# Patient Record
Sex: MALE | Race: AMERICAN INDIAN OR ALASKA NATIVE
[De-identification: names, ages, dates, MRNs, and addresses within clinical notes are randomized per-mention and may not be internally consistent; named-entity substitution may affect disease eponyms.]

---

## 2017-08-19 ENCOUNTER — HOSPITAL ENCOUNTER (INPATIENT)
Dept: HOSPITAL 31 - C.ER | Age: 77
LOS: 4 days | Discharge: HOME | DRG: 287 | End: 2017-08-23
Attending: SPECIALIST | Admitting: SPECIALIST
Payer: MEDICARE

## 2017-08-19 DIAGNOSIS — E66.9: ICD-10-CM

## 2017-08-19 DIAGNOSIS — M19.90: ICD-10-CM

## 2017-08-19 DIAGNOSIS — I24.9: Primary | ICD-10-CM

## 2017-08-19 DIAGNOSIS — Z86.73: ICD-10-CM

## 2017-08-19 DIAGNOSIS — E78.00: ICD-10-CM

## 2017-08-19 DIAGNOSIS — Z87.891: ICD-10-CM

## 2017-08-19 DIAGNOSIS — I35.1: ICD-10-CM

## 2017-08-19 DIAGNOSIS — I77.810: ICD-10-CM

## 2017-08-19 DIAGNOSIS — I10: ICD-10-CM

## 2017-08-19 DIAGNOSIS — J44.9: ICD-10-CM

## 2017-08-19 LAB
ALBUMIN/GLOB SERPL: 1.1 {RATIO} (ref 1–2.1)
ALP SERPL-CCNC: 75 U/L (ref 38–126)
ALT SERPL-CCNC: 41 U/L (ref 21–72)
APTT BLD: 24 SECONDS (ref 21–34)
AST SERPL-CCNC: 31 U/L (ref 17–59)
BASOPHILS # BLD AUTO: 0.1 K/UL (ref 0–0.2)
BASOPHILS NFR BLD: 1.1 % (ref 0–2)
BILIRUB SERPL-MCNC: 1.1 MG/DL (ref 0.2–1.3)
BUN SERPL-MCNC: 11 MG/DL (ref 9–20)
CALCIUM SERPL-MCNC: 9.5 MG/DL (ref 8.6–10.4)
CHLORIDE SERPL-SCNC: 101 MMOL/L (ref 98–107)
CO2 SERPL-SCNC: 27 MMOL/L (ref 22–30)
EOSINOPHIL # BLD AUTO: 0.2 K/UL (ref 0–0.7)
EOSINOPHIL NFR BLD: 2.8 % (ref 0–4)
ERYTHROCYTE [DISTWIDTH] IN BLOOD BY AUTOMATED COUNT: 14.7 % (ref 11.5–14.5)
GLOBULIN SER-MCNC: 3.4 GM/DL (ref 2.2–3.9)
GLUCOSE SERPL-MCNC: 92 MG/DL (ref 75–110)
HCT VFR BLD CALC: 48.7 % (ref 35–51)
INR PPP: 1
LYMPHOCYTES # BLD AUTO: 1.8 K/UL (ref 1–4.3)
LYMPHOCYTES NFR BLD AUTO: 32.9 % (ref 20–40)
MCH RBC QN AUTO: 29.9 PG (ref 27–31)
MCHC RBC AUTO-ENTMCNC: 32.4 G/DL (ref 33–37)
MCV RBC AUTO: 92.5 FL (ref 80–94)
MONOCYTES # BLD: 0.5 K/UL (ref 0–0.8)
MONOCYTES NFR BLD: 8.9 % (ref 0–10)
NRBC BLD AUTO-RTO: 0 % (ref 0–2)
PLATELET # BLD: 170 K/UL (ref 130–400)
PMV BLD AUTO: 6.9 FL (ref 7.2–11.7)
POTASSIUM SERPL-SCNC: 4.2 MMOL/L (ref 3.6–5.2)
PROT SERPL-MCNC: 7.4 G/DL (ref 6.3–8.3)
SODIUM SERPL-SCNC: 139 MMOL/L (ref 132–148)
WBC # BLD AUTO: 5.5 K/UL (ref 4.8–10.8)

## 2017-08-19 RX ADMIN — NITROGLYCERIN SCH EA: 20 OINTMENT TOPICAL at 23:17

## 2017-08-19 RX ADMIN — NITROGLYCERIN SCH EA: 20 OINTMENT TOPICAL at 17:58

## 2017-08-19 NOTE — RAD
HISTORY:

chest pain  



COMPARISON:

Chest x-ray performed 12/18/15 



TECHNIQUE:

Chest, one view.



FINDINGS:

Examination limited by habitus and hypoinflation.



LUNGS:

No focal consolidation.



Please note that chest x-ray has limited sensitivity for the 

detection of pulmonary masses.



PLEURA:

No significant pleural effusion identified. No definite pneumothorax .



CARDIOVASCULAR:

Heart size appears top normal.  Atherosclerotic calcification of the 

aortic knob.



OSSEOUS STRUCTURES:

 Degenerative changes of the spine. 



VISUALIZED UPPER ABDOMEN:

Unremarkable.



OTHER FINDINGS:

None.



IMPRESSION:

Hypoinflation.

## 2017-08-19 NOTE — C.PDOC
History Of Present Illness


77 year old male presents to the ED with complaints of left sided chest pain 

that radiates to the left arm beginning this morning. Patient states he awoke 

this morning went to the bathroom and upon  laying back in bed, the chest pain 

began. Two hours later the pain self resolved. Patient denies shortness of 

breath, nausea, vomiting, fever, history of similar symptoms, or history of a 

stress test.


Time Seen by Provider: 08/19/17 09:44


Chief Complaint (Nursing): Chest Pain


History Per: Patient


History/Exam Limitations: no limitations


Onset/Duration Of Symptoms: Hrs


Current Symptoms Are (Timing): Gone


Quality: "Pain"


Associated Symptoms: denies: Nausea, Dyspnea, Diaphoresis, Syncope


Modifying Factors: None


Alleviating Factors: None


Recent travel outside of the [place]: No


Additional History Per: Prior Records





Past Medical History


Reviewed: Historical Data, Nursing Documentation, Vital Signs


Vital Signs: 


 Last Vital Signs











Temp  98 F   08/19/17 14:06


 


Pulse  66   08/19/17 14:06


 


Resp  18   08/19/17 14:06


 


BP  139/84   08/19/17 14:06


 


Pulse Ox  97   08/19/17 17:00














- Medical History


PMH: Arthritis, HTN, Hypercholesterolemia, TIA (2014)





- [facility] Procedures








FLUOROSCOPY OF MULT COR ART USING L OSM CONTRAST (09/13/16)


FLUOROSCOPY OF RIGHT AND LEFT HEART USING L OSM CONTRAST (09/13/16)


MEASURE OF CARDIAC SAMPL & PRESSURE, L HEART, PERC APPROACH (09/13/16)








Family History: States: Unknown Family Hx





- Social History


Hx Tobacco Use: No


Hx Alcohol Use: No


Hx Substance Use: No





- Immunization History


Hx Tetanus Toxoid Vaccination: No


Hx Influenza Vaccination: No


Hx Pneumococcal Vaccination: No





Review Of Systems


Constitutional: Negative for: Fever, Chills


Cardiovascular: Positive for: Chest Pain


Gastrointestinal: Negative for: Nausea, Vomiting, Abdominal Pain, Diarrhea


Musculoskeletal: Positive for: Arm Pain (left arm pain radiating from chest )





Physical Exam





- Physical Exam


Appears: Non-toxic, No Acute Distress


Skin: Warm, Dry


Head: Atraumatic, Normacephalic


Eye(s): bilateral: EOMI, left: Other ((post surgical- chronic))


Nose: Normal


Oral Mucosa: Moist


Neck: Normal ROM, Supple


Chest: Symmetrical, No Deformity, No Tenderness


Cardiovascular: Rhythm Regular, No Murmur


Respiratory: Normal Breath Sounds, No Rales, No Rhonchi, No Stridor, No Wheezing


Gastrointestinal/Abdominal: Soft, No Tenderness, No Distention, No Guarding, No 

Rebound


Extremity: Normal ROM, No Tenderness, No Pedal Edema, No Calf Tenderness, 

Capillary Refill (good capillary refill, less than two seconds ), No Deformity, 

No Swelling


Neurological/Psych: Oriented x3, Normal Speech, Normal Cognition, Normal 

Cranial Nerves, Normal Motor, Normal Sensation





ED Course And Treatment





- Laboratory Results


Result Diagrams: 


 08/19/17 09:54





 08/19/17 09:54


ECG: Interpreted By Me, Viewed By Me


ECG Rhythm: Sinus Rhythm (75 BPM)


Interpretation Of ECG: T-wave inversions in lateral leads, V3-V6. Unchanged EKG 

from 09/13/2016.


O2 Sat by Pulse Oximetry: 97 (room air )





- Radiology


CXR: Viewed By Me, Read By Radiologist


CXR Interpretation: Yes: Other (Impression: Hypoinflation.)


Progress Note: EKG, blood work, and CXR were ordered. Patient was given Ecotrin 

and IV fluids.  CXR findings:  COMPARISON:  Chest x-ray performed 12/18/15.  

TECHNIQUE:  Chest, one view.  FINDINGS:  Examination limited by habitus and 

hypoinflation.  LUNGS:  No focal consolidation.  Please note that chest x-ray 

has limited sensitivity for the detection of pulmonary masses.  PLEURA:  No 

significant pleural effusion identified. No definite pneumothorax .  

CARDIOVASCULAR:  Heart size appears top normal.  Atherosclerotic calcification 

of the aortic knob.  OSSEOUS STRUCTURES:  Degenerative changes of the spine.  

VISUALIZED UPPER ABDOMEN:  Unremarkable.  OTHER FINDINGS:  None.





- Physician Consult Information


Time Consulting Physician Contacted: 10:40 (contacted again at 11:15am )


Physician Contacted: Ebenezer Mcpherson


Outcome Of Conversation: Discussed case with Dr. Mcpherson at 11:33am and coty 

on plan and admission.





Disposition





- Disposition


Disposition: HOSPITALIZED


Disposition Time: 10:00


Condition: STABLE





- Clinical Impression


Clinical Impression: 


 Chest pain








- Scribe Statement


The provider has reviewed the documentation as recorded by the Scribe





Day Hector





All medical record entries made by the Scribe were at my direction and 

personally dictated by me. I have reviewed the chart and agree that the record 

accurately reflects my personal performance of the history, physical exam, 

medical decision making, and the department course for this patient. I have 

also personally directed, reviewed, and agree with the discharge instructions 

and disposition.

## 2017-08-20 RX ADMIN — ENOXAPARIN SODIUM SCH MG: 120 INJECTION SUBCUTANEOUS at 18:49

## 2017-08-20 RX ADMIN — NITROGLYCERIN SCH EA: 20 OINTMENT TOPICAL at 13:03

## 2017-08-20 RX ADMIN — NITROGLYCERIN SCH EA: 20 OINTMENT TOPICAL at 06:02

## 2017-08-20 RX ADMIN — NITROGLYCERIN SCH EA: 20 OINTMENT TOPICAL at 18:49

## 2017-08-20 NOTE — CON
DATE:



CARDIOLOGY CONSULT



REASON FOR CONSULTATION:  Chest pain.



HISTORY OF PRESENT ILLNESS:  The patient is a 77-year-old -American

male who has a history of hypertension.  The patient underwent cardiac

evaluation in September of last year after he presented because of syncopal

episode and EKG was consistent with anterolateral ischemic EKG changes.  A

cardiac catheterization revealed normal coronary circulation and normal

ejection fraction.  The echocardiographic study revealed normal left

ventricular systolic function with a reduced compliance and mild aortic

insufficiency.  The patient presents because of chest discomfort and the

patient states that he used the bathroom and went to rest in bed, he

started to experience left-sided chest pain, but could not characterize it.

The patient denies any radiation of his chest pain, _____ lasted few

minutes and patient came to the emergency room.  The patient denies any

associated diaphoresis or shortness of breath.



SOCIAL HISTORY:  Nonsmoker.  Lives with his wife.



MEDICATIONS:  The patient received aspirin 325 once a day in the emergency

room and normal saline infusion.  The patient's home medications include

hydralazine 25 mg twice a day, Crestor 10 mg once a day, losartan 50 mg

once a day, Bystolic 5 mg daily.



REVIEW OF SYSTEMS:  No nausea or vomiting.  No fever or chills.  No recent

dizziness or syncope.



PHYSICAL EXAMINATION

GENERAL:  The patient is an elderly male who does not appear to be in any

acute distress.

VITAL SIGNS:  Blood pressure 150/89, heart rate 69, temperature 97.9, and

respirations 18.

HEENT:  Normocephalic.

NECK:  No JVD.

CHEST:  Clear.

HEART:  S1 and S2 regular.

ABDOMEN:  Soft.

EXTREMITIES:  No edema.  No calf tenderness.



LABORATORY DATA:  SMA-7:  Sodium 139, potassium 4.2, chloride 101, CO2 of

27, glucose 92, BUN 11, creatinine 1.0.  One set of troponin is negative. 

Hemoglobin and hematocrit 15.8 and 48.7.  White count and platelet count

are within normal limit.  EKG revealed sinus rhythm with nonspecific

anterolateral Q-wave changes, although the computer reading was ischemic

anterolateral EKG changes.



ASSESSMENT:

1.  Chest pain, rule out myocardial infarction.

2.  Hypertension.

3.  Abnormal EKG with evidence of nonspecific lateral Q-wave changes.



RECOMMENDATIONS:  Resume home medications including Crestor 10 mg once a

day, hydralazine 25 mg once a day, Cozaar 50 mg once a day, Bystolic 5 mg

daily.  Monitor daily EKGs and serial cardiac enzymes.  Obtain serum

D-dimer.  I did review the coronary angiography images that were performed

in 09/2016 and it did confirm normal coronary circulation and it is

unlikely that patient would develop a significant disease in such a short

period.







__________________________________________

Mick Jain MD



DD:  08/19/2017 13:07:22

DT:  08/19/2017 23:35:05

Job # [id number]

## 2017-08-20 NOTE — CT
CTA chest PE protocol 



Indication: Rule out PE 



Technique: 



Contiguous axial images were obtained through the chest with 

intravenous contrast enhancement. Sagittal and coronal 

reconstructions were generated and reviewed. 



This CT exam was performed using 1 or more of the falling dose 

reduction techniques: Automated exposure control, adjustment of the 

MAA and/or kV according to patient size, and/or use of iterative 

reconstruction technique. 



IV Contrast: 100 mL Visipaque 320 







Radiation dose (DLP): 1490.47 MGy-cm. 



Comparison: Chest x-ray performed 8/19/17 



Findings: 



Visualized portions of the inferior thyroid gland appear 

unremarkable. The mediastinal and hilar vascular structures appear 

within normal limits.  The heart appears within normal limits of 

size. 



No large central or segmental pulmonary embolus evident.



No focal consolidation. No pleural effusion. No pneumothorax. 



Small hiatal hernia. 



Limited visualized portions of the upper abdomen demonstrates 16.1 cm 

cystic appearing lesion arising from the right kidney and 6.1 cm left 

renal cyst.  Additional too small to characterize as well a cystic 

renal hypodensities are identified. 



Degenerative changes of the spine. Mild kyphosis. Anterior confluent 

osteophyte formation. 



Impression: 



No large central or segmental pulmonary embolus identified. 



Small hiatal hernia. 



16.1 cm cystic appearing lesion arising from the right kidney and 6.1 

cm left renal cyst.  Additional too small to characterize 

hypodensities as well as probable cysts noted bilaterally.

## 2017-08-20 NOTE — HP
HISTORY OF PRESENT ILLNESS:  This patient is a 77-year-old male with

history of hypertension, COPD, arthritis, and hyperlipidemia.  The patient

has a history of TIA in the past.  The patient came to the emergency room

complaining of chest pain.  The patient has stated that he was lying down

when he started developing some left-sided chest pain.  It stayed for at

least 2 hours, and he was prompted by his family to come to the emergency

room, which was done, and eventually the patient came to the hospital with

chest pain and has received medications, but now at the time of evaluation,

the patient denied any chest pain now, but feels somewhat weak.



ALLERGIES:  THE PATIENT HAS NO KNOWN ALLERGY.



PAST MEDICAL HISTORY:  On admission, history of hypertension,

hyperlipidemia, TIA, osteoarthritis, and obesity.



SOCIAL HISTORY:  No smoking or alcohol abuse, but the patient apparently

used to smoke before.



FAMILY HISTORY:  No inherited disease.



REVIEW OF SYSTEMS:

RESPIRATORY:  There is some shortness of breath on exertion.

CARDIOVASCULAR:  The patient had a chest pain before, but now the patient

is chest pain free.

GASTROINTESTINAL:  No nausea or vomiting.

GENITOURINARY:  No dysuria, but the patient has been having the nocturia 3

to 4 times at night.

NEUROLOGIC:  The patient feels somewhat weak.



PHYSICAL EXAMINATION:

GENERAL:  The patient is alert, awake, and oriented x3.

VITAL SIGNS:  Blood pressure 139/84, pulse 66, respirations 18, and

temperature 98.

HEENT:  Head is normocephalic.  Mouth:  Multiple _____.

NECK:  Supple.  No JVD.

LUNGS:  Clear.

HEART:  Regular rate and rhythm, but there is a split of the first sound

noted, which seems new.

ABDOMEN:  Soft, obese, no tenderness.

EXTREMITIES:  There is some tenderness of the left hip.  There is no edema.

NEUROLOGIC:  Unsteady gait.



LABORATORY DATA:  The patient had some tests done.  WBC 5.5, hemoglobin

16.8, hematocrit 48.7, and platelets 170.  Chemistries:  Sodium 139,

potassium 4.2, chloride 101, bicarb 27, BUN 11, creatinine 1.  Troponin is

less than _____.  Pro-BNP is 348.



IMPRESSION:

1.  Chest pain, acute coronary syndrome, rule out myocardial infarction.

2.  Hypertension.

3.  Osteoarthritis.

4.  Hyperlipidemia.

5.  Obesity.



The patient will have a cardiology consult with Dr. Jain.



Thank you.





__________________________________________

Ebenezer Mcpherson MD







DD:  08/19/2017 15:26:17

DT:  08/20/2017 3:05:03

Job # [id number]

## 2017-08-20 NOTE — PN
DATE:



SUBJECTIVE:  The patient denies chest pain.  No reported ventricular

arrhythmia.



PHYSICAL EXAMINATION:

VITAL SIGNS:  Blood pressure 142/83, heart rate 71, temperature 98.2,

respiration 20.

HEENT:  Normocephalic.

CHEST:  Clear.

HEART:  S1 and S2, regular.

ABDOMEN:  Soft.

EXTREMITIES:  No edema.



LABORATORY DATA:  D-dimer is 775.  Chest x-ray was unremarkable except for

prominent bronchovascular markings.



ASSESSMENT:

1.  Chest pain, rule out myocardial infarction.

2.  Rule out pulmonary infarction.

3.  Hypertension.



RECOMMENDATIONS:  Continue Bystolic at 5 mg daily, hydralazine 25 mg twice

a day, Crestor 10 mg once a day, aspirin 81 mg once day, subcutaneous

heparin 5000 units twice a day.  I did request stat CT angio of the chest

to rule out pulmonary embolism.  Obtain 12-lead EKG and one more set of

troponin.





__________________________________________

Mick Jain MD



DD:  08/20/2017 13:06:39

DT:  08/20/2017 16:44:38

Job # [id number]

## 2017-08-21 LAB
ALBUMIN/GLOB SERPL: 1.1 {RATIO} (ref 1–2.1)
ALP SERPL-CCNC: 70 U/L (ref 38–126)
ALT SERPL-CCNC: 39 U/L (ref 21–72)
AST SERPL-CCNC: 35 U/L (ref 17–59)
BASOPHILS # BLD AUTO: 0 K/UL (ref 0–0.2)
BASOPHILS NFR BLD: 0.8 % (ref 0–2)
BILIRUB SERPL-MCNC: 0.8 MG/DL (ref 0.2–1.3)
BUN SERPL-MCNC: 13 MG/DL (ref 9–20)
CALCIUM SERPL-MCNC: 9.2 MG/DL (ref 8.6–10.4)
CHLORIDE SERPL-SCNC: 103 MMOL/L (ref 98–107)
CHOLEST SERPL-MCNC: 132 MG/DL (ref 0–199)
CO2 SERPL-SCNC: 25 MMOL/L (ref 22–30)
EOSINOPHIL # BLD AUTO: 0.2 K/UL (ref 0–0.7)
EOSINOPHIL NFR BLD: 4.3 % (ref 0–4)
ERYTHROCYTE [DISTWIDTH] IN BLOOD BY AUTOMATED COUNT: 15 % (ref 11.5–14.5)
GLOBULIN SER-MCNC: 3.1 GM/DL (ref 2.2–3.9)
GLUCOSE SERPL-MCNC: 86 MG/DL (ref 75–110)
HCT VFR BLD CALC: 44 % (ref 35–51)
LYMPHOCYTES # BLD AUTO: 1.9 K/UL (ref 1–4.3)
LYMPHOCYTES NFR BLD AUTO: 32.5 % (ref 20–40)
MCH RBC QN AUTO: 29.7 PG (ref 27–31)
MCHC RBC AUTO-ENTMCNC: 32.3 G/DL (ref 33–37)
MCV RBC AUTO: 92.1 FL (ref 80–94)
MONOCYTES # BLD: 0.4 K/UL (ref 0–0.8)
MONOCYTES NFR BLD: 7.2 % (ref 0–10)
NRBC BLD AUTO-RTO: 0.2 % (ref 0–2)
PLATELET # BLD: 158 K/UL (ref 130–400)
PMV BLD AUTO: 7.3 FL (ref 7.2–11.7)
POTASSIUM SERPL-SCNC: 3.8 MMOL/L (ref 3.6–5.2)
PROT SERPL-MCNC: 6.4 G/DL (ref 6.3–8.3)
SODIUM SERPL-SCNC: 136 MMOL/L (ref 132–148)
TSH SERPL-ACNC: 2.03 MIU/L (ref 0.46–4.68)
WBC # BLD AUTO: 5.7 K/UL (ref 4.8–10.8)

## 2017-08-21 RX ADMIN — ENOXAPARIN SODIUM SCH MG: 120 INJECTION SUBCUTANEOUS at 17:32

## 2017-08-21 RX ADMIN — NITROGLYCERIN SCH EA: 20 OINTMENT TOPICAL at 17:32

## 2017-08-21 RX ADMIN — NITROGLYCERIN SCH EA: 20 OINTMENT TOPICAL at 06:00

## 2017-08-21 RX ADMIN — ENOXAPARIN SODIUM SCH MG: 120 INJECTION SUBCUTANEOUS at 06:06

## 2017-08-21 RX ADMIN — NITROGLYCERIN SCH EA: 20 OINTMENT TOPICAL at 13:00

## 2017-08-21 RX ADMIN — NITROGLYCERIN SCH EA: 20 OINTMENT TOPICAL at 00:27

## 2017-08-21 NOTE — CARD
--------------- APPROVED REPORT --------------





EXAM: Two-dimensional and M-mode echocardiogram with Doppler and 

color Doppler.



Other Information 

Quality : GoodRhythm : NSR



INDICATION

Chest Pain Syncope Transient Ishemic Attack



RISK FACTORS

Hypertension 

Hyperlipidemia



2D DIMENSIONS 

IVSd1.6   (0.7-1.1cm)LVDd4.5   (3.9-5.9cm)

PWd1.5   (0.7-1.1cm)LVDs3.2   (2.5-4.0cm)

FS (%) 27.9   %LVEF (%)54.1   (>50%)



M-Mode DIMENSIONS 

Left Atrium (MM)3.55   (2.5-4.0cm)Aortic Root4.05   (2.2-3.7cm)

Aortic Cusp Exc.1.07   (1.5-2.0cm)



Mitral Valve

MV E Sbietojj00.7cm/sMV A Ziauszby29.9cm/sE/A ratio0.5



TDI

E/Lateral E'0.0E/Medial E'0.0



 LEFT VENTRICLE 

The left ventricle is normal size.

There is mild concentric left ventricular hypertrophy.

The left ventricular systolic function is normal.

The left ventricular ejection fraction is within the normal range.

There is normal LV segmental wall motion.

Transmitral Doppler flow pattern is Grade I-abnormal relaxation 

pattern.



 RIGHT VENTRICLE 

The right ventricle is normal size.

The right ventricular systolic function is normal.



 ATRIA 

The left atrium size is normal.

The right atrium size is normal.



 AORTIC VALVE 

The aortic valve is mildly calcified but opens well.

There is trace to mild aortic regurgitation.

There is no aortic valvular stenosis. 



 MITRAL VALVE 

The mitral valve is normal in structure.

There is no mitral valve regurgitation noted.



 TRICUSPID VALVE 

The tricuspid valve is normal in structure.

There is no tricuspid valve regurgitation noted.



 PULMONIC VALVE 

The pulmonary valve is normal in structure.



 GREAT VESSELS 

The aortic root displays mild sclerocalcific changes.

The IVC is normal in size and collapses >50% with inspiration.



 PERICARDIAL EFFUSION 

There is no pericardial effusion.



<Conclusion>

There is mild concentric left ventricular hypertrophy. The left 

ventricular systolic function is normal. There is normal LV segmental 

wall motion.

Transmitral Doppler flow pattern is Grade I-abnormal relaxation 

pattern.

The right ventricular systolic function is normal.

There is trace to mild aortic regurgitation. The aortic valve is 

mildly calcified but opens well.

The aortic root displays mild sclerocalcific changes.

There is no pericardial effusion.

## 2017-08-21 NOTE — PN
SUBJECTIVE:  The patient denies any chest pain or shortness of breath.  No

reported ventricular arrhythmia.



PHYSICAL EXAMINATION:

VITAL SIGNS:  Blood pressure 129/75, heart rate 60, temperature 97.3,

respiration 20.

HEENT:  Normocephalic.

CHEST:  Clear.

HEART:  S1 and S2, regular.

EXTREMITIES:  No edema.



LABORATORY DATA:  Hemoglobin and hematocrit 14.2 and 44.2, white count and

platelet count are within normal limit.  SMA-7 is within normal limit. 

Troponins were as follows starting from second set 3.76, 3.01 and 2.0.



CT angio of the chest, no central pulmonary embolus.



ASSESSMENT:

1.  Consider non-ST elevation myocardial infarction.

2.  Hypertension.



RECOMMENDATIONS:  Case was discussed yesterday *------* Dr. Mcpherson.  The

patient was placed on subcutaneous Lovenox at 110 mg twice a day, yesterday

and was started on Plavix 75 mg once a day, Crestor was increased to 40 mg

daily, continue current Bystolic and hydralazine.  I will review echo

cardiac study, cardiac cath was discussed with the patient; however, it

could not be performed today because of scheduling issue with the cath lab

and will be performed tomorrow at 10 a.m. unless any emergency arise such

as recurrent chest pain, ventricular tachycardia, CHF, or hypotension.







__________________________________________

Mick Jain MD





DD:  08/21/2017 12:34:04

DT:  08/21/2017 13:54:13

Job # [id number]

## 2017-08-22 VITALS — RESPIRATION RATE: 20 BRPM | OXYGEN SATURATION: 95 %

## 2017-08-22 LAB
BUN SERPL-MCNC: 14 MG/DL (ref 9–20)
CALCIUM SERPL-MCNC: 8.8 MG/DL (ref 8.6–10.4)
CHLORIDE SERPL-SCNC: 103 MMOL/L (ref 98–107)
CO2 SERPL-SCNC: 24 MMOL/L (ref 22–30)
ERYTHROCYTE [DISTWIDTH] IN BLOOD BY AUTOMATED COUNT: 14.5 % (ref 11.5–14.5)
ESTIM. PROBABILITY CANCER: 1 PERCENT
GLUCOSE SERPL-MCNC: 90 MG/DL (ref 75–110)
HCT VFR BLD CALC: 44.5 % (ref 35–51)
MCH RBC QN AUTO: 30 PG (ref 27–31)
MCHC RBC AUTO-ENTMCNC: 32.3 G/DL (ref 33–37)
MCV RBC AUTO: 93.1 FL (ref 80–94)
PLATELET # BLD: 177 K/UL (ref 130–400)
PMV BLD AUTO: 7 FL (ref 7.2–11.7)
POTASSIUM SERPL-SCNC: 4 MMOL/L (ref 3.6–5.2)
PSA SERPL-MCNC: 0.4 NG/ML (ref ?–4)
SODIUM SERPL-SCNC: 137 MMOL/L (ref 132–148)
WBC # BLD AUTO: 6.2 K/UL (ref 4.8–10.8)

## 2017-08-22 PROCEDURE — B2111ZZ FLUOROSCOPY OF MULTIPLE CORONARY ARTERIES USING LOW OSMOLAR CONTRAST: ICD-10-PCS | Performed by: SPECIALIST

## 2017-08-22 PROCEDURE — 4A023N7 MEASUREMENT OF CARDIAC SAMPLING AND PRESSURE, LEFT HEART, PERCUTANEOUS APPROACH: ICD-10-PCS | Performed by: SPECIALIST

## 2017-08-22 PROCEDURE — B2151ZZ FLUOROSCOPY OF LEFT HEART USING LOW OSMOLAR CONTRAST: ICD-10-PCS | Performed by: SPECIALIST

## 2017-08-22 PROCEDURE — B3101ZZ FLUOROSCOPY OF THORACIC AORTA USING LOW OSMOLAR CONTRAST: ICD-10-PCS | Performed by: SPECIALIST

## 2017-08-22 RX ADMIN — NITROGLYCERIN SCH EA: 20 OINTMENT TOPICAL at 00:31

## 2017-08-22 RX ADMIN — NITROGLYCERIN SCH EA: 20 OINTMENT TOPICAL at 18:03

## 2017-08-22 RX ADMIN — ENOXAPARIN SODIUM SCH MG: 120 INJECTION SUBCUTANEOUS at 18:03

## 2017-08-22 RX ADMIN — ENOXAPARIN SODIUM SCH: 120 INJECTION SUBCUTANEOUS at 05:22

## 2017-08-22 RX ADMIN — NITROGLYCERIN SCH EA: 20 OINTMENT TOPICAL at 05:23

## 2017-08-22 RX ADMIN — NITROGLYCERIN SCH EA: 20 OINTMENT TOPICAL at 12:48

## 2017-08-22 NOTE — PN
DATE:



SUBJECTIVE:  Today, the patient is alert and awake, denied any shortness of

breath.  No chest pain.  No palpitation.  The patient is due for a cardiac

catheterization today.



PHYSICAL EXAMINATION

VITAL SIGNS:  The patient has a blood pressure of 145/78, pulse 65,

respiration 20 and temperature 97.6

NECK:  Supple.  No JVD.

LUNGS:  Clear.

HEART:  Regular rate and rhythm.  Positive murmur.

ABDOMEN:  Soft and nontender.  Positive bowel sounds.

EXTREMITIES:  There is no edema, but there is some tenderness to the left

heel.



LABORATORY DATA:  The patient had blood tests done.  WBC is 6.2, hemoglobin

14.4, hematocrit 44.5 and platelet 177.  Chemistry showed sodium of 137,

potassium 4, chloride 103, bicarb is 24, BUN is 14 and creatinine is 1. 

Also today, the patient had cardiac catheterization done today by Dr. Jain, cardiologist and apparently there is no evidence of stenosis or

blockage, so the coronaries ______ are clear.



PLAN:  We are going to ______ medication.  The patient will be on aspirin

and Plavix.  The patient will be discharged home soon, possibly early

morning.





__________________________________________

Ebenezer Mcpherson MD



DD:  08/22/2017 19:03:35

DT:  08/22/2017 19:37:25

Job # [id number]

## 2017-08-22 NOTE — CARDCATH
PROCEDURE DATE:



The patient is a 77-year-old  male who has a history of

hypertension, presented with chest pain.  Troponin was elevated.  CT angio

of the chest revealed no evidence of central pulmonary embolism.  The

patient had cardiac catheterization in September of last year that was

unremarkable.  Cardiac catheterization was recommended again.  The

procedure and its risks were fully explained to the patient, who understood

them and agreed for the procedure.



PROCEDURES:

1.  _____ 1% Lidocaine, a 6-inch sheath was placed at right femoral artery.

2.  Left and right coronary angiography performed with 6-inch JL4 and JR4

diagnostic catheter.

3.  Left angiogram and aortogram were performed with a 6-inch pigtail

catheter.  The patient tolerated the procedure well without any

complications.



ANGIOGRAPHIC FINDINGS:  Selective injection of the left coronary artery

revealed left main to be a normal vessel, left main bifurcated into medium

size LAD and medium size circumflex artery.  Other than myocardial bridging

of the mid to distal LAD, the left coronary system was angiographically

unremarkable.  Selective injection of the right coronary artery revealed a

medium size codominant vessel, otherwise angiographically unremarkable. 

Left ventriculogram performed in the JAY projection revealed mild apical

hypokinesis.  Overall ejection fraction is maintained at 55%.  Aortography

performed in Wallisian projection revealed dilated aortic root and arch.  There

was no evidence of aortic dissection.



CONCLUSION:  Unremarkable coronary circulation and normal ejection

fraction.



CONDITION:  Continue current antiplatelet therapy.  A possibility of small

vessel disease cannot be completely excluded.  In the mean time, I will

request venous Doppler of the lower extremities.





__________________________________________

Mick Jain MD





DD:  08/22/2017 11:16:04

DT:  08/22/2017 13:34:05

Job # [id number]

## 2017-08-22 NOTE — PN
DATE:  8/21/2017



SUBJECTIVE:  Today the patient is alert and awake.  Denies any chest pain

and no shortness of breath.  No coughing and the patient had a bowel

movement.



PHYSICAL EXAMINATION:

VITAL SIGNS:  Blood pressure of 127/75, pulse rate 60 and respirations are

20.

HEENT:  Head is normocephalic.  Mouth is ------ to close.

NECK:  Supple.

LUNGS:  Clear.

HEART:  Regular rate and rhythm, but there is a split of the first heart

sound noted, but is disappearing today as comparison to yesterday.

ABDOMEN:  Soft, obese and nontender.

EXTREMITIES:  There is no edema, but there is tenderness in the left hip.



LABORATORY DATA:  Blood work done shows WBC of 5.7, hemoglobin of 14.3,

hematocrit 44 and platelets 168.  Chemistries:  Sodium 136, potassium 3.8,

chloride 103, bicarb 25, BUN 13, creatinine 1.1 and glucose 86.  The

troponin went down to 2 from 3.01.  Albumin is 3.3, globulin 3.1, ------,

LDH 68, HDL is 64 and TSH is 2.03.



PLAN:  The patient has a echocardiogram and echocardiogram done today, so

there is a good ejection fraction of ------ and there is a mild concentric

left ventricular hypertrophy and left ventricular systolic function is

normal and normal LV segmental wall motion.  So, case was discussed with

Dr. Jain, cardiologist.  The plan is cardiac catheterization in the

morning and also the case was reviewed and discussed with Aimee Chang,

nurse practitioner and the patient is on Lovenox, Plavix, and aspirin.





__________________________________________

Ebenezer Mcpherson MD





DD:  08/21/2017 19:23:03

DT:  08/21/2017 23:27:21

Job # [id number]

## 2017-08-22 NOTE — PN
SUBJECTIVE:  Today, the patient is alert and awake and denies any shortness

of breath.  No chest pain.  The patient is comfortably resting in bed.  The

patient denies any dizziness, but occasional palpitation.



PHYSICAL EXAMINATION:

VITAL SIGNS:  The patient has blood pressure of 142/83, pulse 71,

respirations 20, temperature 98.2.

HEENT:  Head and mouth are with no significant symptoms.

NECK:  Supple.

LUNGS:  Clear.

HEART:  Regular rate and rhythm, but there is a split of the first heart

sound.

ABDOMEN:  Soft, obese.  No tenderness.

EXTREMITIES:  There is no edema.  There is some tenderness of the left hip,

which is old.



LABORATORY DATA:  Troponin jumped from 0.01 to 2.70 and the D-dimer is 775.

PT is 9 and INR is 1.



PLAN:  We are going to repeat the troponin stat.  CT of the chest that was

done earlier shows no significant symptoms of pulmonary embolism.  The GI

_____ hiatal hernia, and a cystic-appearing lesion arising from the right

kidney _____.  Otherwise, there is no pulmonary embolism as described in CT

angio.





__________________________________________

Ebenezer Mcpherson MD



DD:  08/20/2017 15:14:34

DT:  08/20/2017 21:30:04

Job # [id number]

## 2017-08-22 NOTE — CARD
--------------- APPROVED REPORT --------------





EKG Measurement

Heart [id number]

MT [id number]

[id number]

[id number]

[id number]



<Conclusion>

Sinus rhythm with 1st degree AV block

Left anterior fascicular block

T wave abnormality, consider anterolateral ischemia

Abnormal ECG

## 2017-08-23 VITALS — DIASTOLIC BLOOD PRESSURE: 74 MMHG | HEART RATE: 66 BPM | SYSTOLIC BLOOD PRESSURE: 121 MMHG

## 2017-08-23 VITALS — TEMPERATURE: 97.9 F

## 2017-08-23 LAB
BUN SERPL-MCNC: 18 MG/DL (ref 9–20)
CALCIUM SERPL-MCNC: 9.3 MG/DL (ref 8.6–10.4)
CHLORIDE SERPL-SCNC: 104 MMOL/L (ref 98–107)
CO2 SERPL-SCNC: 24 MMOL/L (ref 22–30)
ERYTHROCYTE [DISTWIDTH] IN BLOOD BY AUTOMATED COUNT: 15 % (ref 11.5–14.5)
GLUCOSE SERPL-MCNC: 95 MG/DL (ref 75–110)
HCT VFR BLD CALC: 46 % (ref 35–51)
MCH RBC QN AUTO: 30.6 PG (ref 27–31)
MCHC RBC AUTO-ENTMCNC: 33.1 G/DL (ref 33–37)
MCV RBC AUTO: 92.4 FL (ref 80–94)
PLATELET # BLD: 195 K/UL (ref 130–400)
PMV BLD AUTO: 7.1 FL (ref 7.2–11.7)
POTASSIUM SERPL-SCNC: 4.1 MMOL/L (ref 3.6–5.2)
SODIUM SERPL-SCNC: 140 MMOL/L (ref 132–148)
WBC # BLD AUTO: 6.3 K/UL (ref 4.8–10.8)

## 2017-08-23 RX ADMIN — ENOXAPARIN SODIUM SCH MG: 120 INJECTION SUBCUTANEOUS at 05:58

## 2017-08-23 RX ADMIN — NITROGLYCERIN SCH EA: 20 OINTMENT TOPICAL at 01:02

## 2017-08-23 RX ADMIN — NITROGLYCERIN SCH EA: 20 OINTMENT TOPICAL at 05:57

## 2017-08-23 NOTE — CP.PCM.PN
Subjective





- Date & Time of Evaluation


Date of Evaluation: 08/23/17


Time of Evaluation: 11:32





- Subjective


Subjective: 





PT SEEN BY DR. PLASCENCIA DURING ROUNDS WITH NP.  CLEARED FOR D/C BY CARDIOLOGY.  

TO BE D/C HOME TODAY WITH ASA AND PLAVIX PER DR. IGLESIAS.  CONTINUE ALL OTHER 

HOME MEDS AND HE WILL SEE DR. PLASCENCIA NEXT WEEK IN OFFICE FOR A F/U.  NO 

FURTHER ORDERS.





Objective





- Vital Signs/Intake and Output


Vital Signs (last 24 hours): 


 











Temp Pulse Resp BP Pulse Ox


 


 97.9 F   65   20   113/67   95 


 


 08/23/17 07:00  08/23/17 07:00  08/23/17 07:00  08/23/17 07:00  08/23/17 07:00











- Medications


Medications: 


 Current Medications





Aspirin (Ecotrin)  81 mg PO DAILY [facility]


   Last Admin: 08/23/17 10:43 Dose:  81 mg


Clopidogrel Bisulfate (Plavix)  75 mg PO DAILY [facility]


   Last Admin: 08/23/17 10:43 Dose:  75 mg


Enoxaparin Sodium (Lovenox)  110 mg SC Q12H [facility]


   Last Admin: 08/23/17 05:58 Dose:  110 mg


Hydralazine HCl (Apresoline)  25 mg PO BID [facility]


   Last Admin: 08/23/17 10:43 Dose:  25 mg


Losartan Potassium (Cozaar)  50 mg PO DAILY [facility]


   Last Admin: 08/23/17 10:43 Dose:  50 mg


Nebivolol (Bystolic)  5 mg PO DAILY [facility]


   Last Admin: 08/23/17 10:42 Dose:  5 mg


Nitroglycerin (Nitro-Bid 2% Oint)  1 ea TOP Q6 [facility]


   Last Admin: 08/23/17 05:57 Dose:  1 ea


Rosuvastatin Calcium (Crestor)  40 mg PO HS [facility]


   Last Admin: 08/22/17 21:17 Dose:  40 mg











- Labs


Labs: 


 





 08/23/17 07:42 





 08/23/17 07:42 





 











PT  11.0 SECONDS (9.7-12.2)   08/19/17  14:17    


 


INR  1.0   08/19/17  14:17    


 


APTT  24 SECONDS (21-34)   08/19/17  14:17

## 2017-08-23 NOTE — CP.PCM.PN
Subjective





- Date & Time of Evaluation


Date of Evaluation: 08/23/17


Time of Evaluation: 11:29





Objective





- Vital Signs/Intake and Output


Vital Signs (last 24 hours): 


 











Temp Pulse Resp BP Pulse Ox


 


 97.9 F   65   20   113/67   95 


 


 08/23/17 07:00  08/23/17 07:00  08/23/17 07:00  08/23/17 07:00  08/23/17 07:00











- Medications


Medications: 


 Current Medications





Aspirin (Ecotrin)  81 mg PO DAILY [facility]


   Last Admin: 08/23/17 10:43 Dose:  81 mg


Clopidogrel Bisulfate (Plavix)  75 mg PO DAILY [facility]


   Last Admin: 08/23/17 10:43 Dose:  75 mg


Enoxaparin Sodium (Lovenox)  110 mg SC Q12H [facility]


   Last Admin: 08/23/17 05:58 Dose:  110 mg


Hydralazine HCl (Apresoline)  25 mg PO BID [facility]


   Last Admin: 08/23/17 10:43 Dose:  25 mg


Losartan Potassium (Cozaar)  50 mg PO DAILY [facility]


   Last Admin: 08/23/17 10:43 Dose:  50 mg


Nebivolol (Bystolic)  5 mg PO DAILY [facility]


   Last Admin: 08/23/17 10:42 Dose:  5 mg


Nitroglycerin (Nitro-Bid 2% Oint)  1 ea TOP Q6 [facility]


   Last Admin: 08/23/17 05:57 Dose:  1 ea


Rosuvastatin Calcium (Crestor)  40 mg PO HS [facility]


   Last Admin: 08/22/17 21:17 Dose:  40 mg











- Labs


Labs: 


 





 08/23/17 07:42 





 08/23/17 07:42 





 











PT  11.0 SECONDS (9.7-12.2)   08/19/17  14:17    


 


INR  1.0   08/19/17  14:17    


 


APTT  24 SECONDS (21-34)   08/19/17  14:17

## 2017-08-24 NOTE — DS
This is a 77-year-old male with history of arthritis, hypertension and

history of TIA in the past.  The patient came to the emergency room.  The

patient was complaining of a left-sided chest pain for the past hour.  The

patient was admitted.  The patient had a troponin done, but _____, the

troponin was elevated at the range of 3 plus.  A cardiology consult has

been _____ found a high troponin and the plan was made to have a cardiac

catheterization.  In the meantime, the patient was receiving medications to

control the blood pressure and consider to monitor the heart.  The patient

has a cardiac catheterization done yesterday which was negative for a

coronary artery stenosis.  Also, the patient had a Doppler scan of the

lower extremity which failed to show any blood clot or DVT.  So, at this

point, the patient is doing pretty well, stable enough to be discharge and

Dr. Jain was contacted and by the nurse practitioner Aimee Chang and

the patient will be discharge home today.





__________________________________________

Ebenezer Mcpherson MD



DD:  08/23/2017 18:41:18

DT:  08/24/2017 1:04:59

Job # [id number]

## 2017-08-24 NOTE — PN
DATE:



SUBJECTIVE:  Today, the patient is alert and awake.  Denies any chest pain.

No palpitations, no dizziness and no shortness of breath.  He is also

complaining of some pain to the right hip.



PHYSICAL EXAMINATION:

VITAL SIGNS:  The patient has blood pressure of 121/74, pulse 66,

respirations 20, temperature 97.9.

HEENT:  Head is normocephalic.  Mouth is moist, but _____.

NECK:  Supple, no JVD.

CARDIOPULMONARY:  Regular rate and rhythm.  Positive murmur.

LUNGS:  Clear.

ABDOMEN:  Soft, obese, nontender, no organomegaly and positive bowel

sounds.

EXTREMITIES:  There is no edema, but there is some tenderness in the right

hip with motion and also the right femoral artery ultrasound was done and

there is no evidence of aneurysm.



ASSESSMENT AND PLAN:  The patient has a cardiac catheterization yesterday,

which showed no major stenosis and coronaries were clean, so the plan is

that to discharge the patient today and if cleared by the cardiologist.  I

mentioned that this patient was seen with Aimee Chang,nurse practitioner

early this morning.





__________________________________________

Ebenezer Mcpherson MD



DD:  08/23/2017 18:37:48

DT:  08/23/2017 19:53:56

Job # [id number]

## 2017-08-28 NOTE — VASCLAB
PROCEDURE:  Lower Extremity Venous Duplex Exam.



HISTORY:

r/o DVT 



PRIORS:

None. 



TECHNIQUE:

Bilateral common femoral, femoral, popliteal and posterior tibial, 

peroneal and great saphenous veins were evaluated. Flow was assessed 

with color Doppler, compressibility, assessment of phasic flow and 

augmentation response.



Report prepared by   SANDRA Pierson



FINDINGS:



RIGHT:

1. Common Femoral Vein: 



1.1. Unable to examine/ bandaged groin area.



2. Femoral Vein:



2.1. Compressibility - Fully compressible: Thrombus -  None : Flow - 

Phasic: Augmentation -Normal: Reflux - None.



3. Popliteal Vein: 



3.1. Compressibility - Fully compressible: Thrombus - None :  Flow - 

Phasic: Augmentation -Normal: Reflux - None.



4. Posterior Tibial Vein: 



4.1. Compressibility - Fully compressible: Thrombus -  None: Flow - 

Phasic: Augmentation -Normal: Reflux - None.



5. Peroneal Vein:



5.1. Compressibility - Fully compressible: Thrombus -  None: Flow - 

Phasic: Augmentation -Normal: Reflux - None.



6. Great Saphenous Vein:

6.1. Compressibility - Fully compressible: Thrombus - None: Flow - 

Phasic: Augmentation - Normal: Reflux - None.





LEFT:

1. Common Femoral Vein:



1.1.  Compressibility - Fully compressible: Thrombus -  None: Flow - 

Phasic: Augmentation -Normal: Reflux - None.



2. Femoral Vein:



2.1.  Compressibility - Fully compressible: Thrombus -  None: Flow - 

Phasic: Augmentation -Normal: Reflux - None.



3. Popliteal Vein:



3.1.  Compressibility - Fully compressible: Thrombus -  None : Flow - 

Phasic: Augmentation -Normal: Reflux - None.



4. Posterior Tibial Vein:



4.1.  Compressibility - Fully compressible: Thrombus -  None: Flow - 

Phasic: Augmentation -Normal: Reflux - None.



5. Peroneal Vein:



5.1.  Compressibility - Fully compressible: Thrombus -  None: Flow - 

Phasic: Augmentation -Normal: Reflux - None.



6. Great Saphenous Vein:

6.1.  Compressibility - Fully compressible: Thrombus -  None: Flow - 

Phasic: Augmentation - Normal: Reflux - None.





OTHER FINDINGS:  Right: None significant.



Left: None significant.



IMPRESSION:

Right: 



No evidence of deep or superficial vein thrombosis of the right lower 

extremity, for the examined veins. Normal valve function noted of the 

right side.     



Left: 



No evidence of deep or superficial vein thrombosis of the left lower 

extremity. Normal valve function noted of the left side.

## 2017-09-07 NOTE — CARD
--------------- APPROVED REPORT --------------





EKG Measurement

Heart [id number]

CO 256P27

[id number]

[id number]

[id number]



<Conclusion>

Sinus rhythm with 1st degree AV block

Incomplete right bundle branch block

Left anterior fascicular block

Anterior infarct, age undetermined

Abnormal ECG

## 2019-03-11 ENCOUNTER — HOSPITAL ENCOUNTER (INPATIENT)
Dept: HOSPITAL 31 - C.ER | Age: 79
LOS: 7 days | Discharge: HOME | DRG: 312 | End: 2019-03-18
Attending: SPECIALIST | Admitting: SPECIALIST
Payer: MEDICARE

## 2019-03-11 DIAGNOSIS — E66.9: ICD-10-CM

## 2019-03-11 DIAGNOSIS — I10: ICD-10-CM

## 2019-03-11 DIAGNOSIS — Z87.891: ICD-10-CM

## 2019-03-11 DIAGNOSIS — I35.1: ICD-10-CM

## 2019-03-11 DIAGNOSIS — H54.40: ICD-10-CM

## 2019-03-11 DIAGNOSIS — B96.20: ICD-10-CM

## 2019-03-11 DIAGNOSIS — Z86.73: ICD-10-CM

## 2019-03-11 DIAGNOSIS — E87.1: ICD-10-CM

## 2019-03-11 DIAGNOSIS — E78.5: ICD-10-CM

## 2019-03-11 DIAGNOSIS — E78.00: ICD-10-CM
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
## 2019-03-11 DIAGNOSIS — J43.9: ICD-10-CM

## 2019-03-11 DIAGNOSIS — I49.3: ICD-10-CM

## 2019-03-11 DIAGNOSIS — M19.90: ICD-10-CM

## 2019-03-11 DIAGNOSIS — N39.0: ICD-10-CM

## 2019-03-11 DIAGNOSIS — I47.2: ICD-10-CM
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
## 2019-03-11 DIAGNOSIS — R55: Primary | ICD-10-CM

## 2019-03-11 DIAGNOSIS — S22.42XA: ICD-10-CM

## 2019-03-11 LAB
ALBUMIN SERPL-MCNC: 4.2 G/DL (ref 3.5–5)
ALBUMIN/GLOB SERPL: 1.1 {RATIO} (ref 1–2.1)
ALT SERPL-CCNC: 21 U/L (ref 21–72)
ANISOCYTOSIS BLD QL SMEAR: SLIGHT
APTT BLD: 25 SECONDS (ref 21–34)
AST SERPL-CCNC: 35 U/L (ref 17–59)
BASOPHILS # BLD AUTO: 0.1 K/UL (ref 0–0.2)
BASOPHILS NFR BLD: 1 % (ref 0–2)
BUN SERPL-MCNC: 15 MG/DL (ref 9–20)
CALCIUM SERPL-MCNC: 9.8 MG/DL (ref 8.6–10.4)
EOSINOPHIL # BLD AUTO: 0.1 K/UL (ref 0–0.7)
EOSINOPHIL NFR BLD: 0.9 % (ref 0–4)
EOSINOPHIL NFR BLD: 1 % (ref 0–4)
ERYTHROCYTE [DISTWIDTH] IN BLOOD BY AUTOMATED COUNT: 14.9 % (ref 11.5–14.5)
GFR NON-AFRICAN AMERICAN: 49
HGB BLD-MCNC: 14 G/DL (ref 12–18)
HYPERSEGMENTATION: PRESENT
INR PPP: 1.1
LG PLATELETS BLD QL SMEAR: PRESENT
LYMPHOCYTE: 10 % (ref 20–40)
LYMPHOCYTES # BLD AUTO: 1.1 K/UL (ref 1–4.3)
LYMPHOCYTES NFR BLD AUTO: 9.1 % (ref 20–40)
MCH RBC QN AUTO: 31.4 PG (ref 27–31)
MCHC RBC AUTO-ENTMCNC: 31.4 G/DL (ref 33–37)
MCV RBC AUTO: 100.1 FL (ref 80–94)
MICROCYTES BLD QL SMEAR: SLIGHT
MONOCYTE: 7 % (ref 0–10)
MONOCYTES # BLD: 0.8 K/UL (ref 0–0.8)
MONOCYTES NFR BLD: 6.3 % (ref 0–10)
NEUTROPHILS # BLD: 9.8 K/UL (ref 1.8–7)
NEUTROPHILS NFR BLD AUTO: 80 % (ref 50–75)
NEUTROPHILS NFR BLD AUTO: 82.7 % (ref 50–75)
NEUTS BAND NFR BLD: 2 % (ref 0–2)
NRBC BLD AUTO-RTO: 0 % (ref 0–2)
PLATELET # BLD EST: NORMAL 10*3/UL
PLATELET # BLD: 198 K/UL (ref 130–400)
PMV BLD AUTO: 7.5 FL (ref 7.2–11.7)
POIKILOCYTOSIS BLD QL SMEAR: SLIGHT
PROTHROMBIN TIME: 11.7 SECONDS (ref 9.7–12.2)
RBC # BLD AUTO: 4.45 MIL/UL (ref 4.4–5.9)
SMUDGE CELLS # BLD: PRESENT 10*3/UL
SPHEROCYTES BLD QL SMEAR: SLIGHT
TOTAL CELLS COUNTED BLD: 100
WBC # BLD AUTO: 11.9 K/UL (ref 4.8–10.8)

## 2019-03-11 RX ADMIN — NITROGLYCERIN SCH EA: 20 OINTMENT TOPICAL at 20:56

## 2019-03-11 NOTE — C.PDOC
History Of Present Illness


Patient is a 79 year old male who presents to the ED after suffering a syncopal 

episode this morning. Patient states that he hasn't been feeling well and while 

at his computer today he got light headed and had a syncopal episode when 

getting up. Patient states that his son was knocking on the door, but patient 

was up by the time his son came in. He notes positive LOC and is also c/o left 

sided rib discomfort. He denies any CP, SOB, visual changes, weakness, or 

palpitations. 





Time Seen by Provider: 03/11/19 19:22


Chief Complaint (Nursing): Syncope


History Per: Patient


History/Exam Limitations: no limitations


Onset/Duration Of Symptoms: Hrs (monring )


Current Symptoms Are (Timing): Still Present (left rib discomfort)


Severity: Moderate


Pain Scale Rating Of: 4


Reports Recently: Treated By A Physician


Recent travel outside of the [place]: No


Additional History Per: Patient





Past Medical History


Reviewed: Historical Data, Nursing Documentation, Vital Signs


Vital Signs: 





                                Last Vital Signs











Temp  98.1 F   03/11/19 19:12


 


Pulse  83   03/11/19 19:12


 


Resp  16   03/11/19 19:12


 


BP  137/64   03/11/19 19:12


 


Pulse Ox  96   03/11/19 19:12














- Medical History


PMH: Arthritis, HTN, Hypercholesterolemia, TIA (2014)


   Denies: Chronic Kidney Disease


Surgical History: No Surg Hx





- [facility] Procedures











FLUOROSCOPY OF LEFT HEART USING LOW OSMOLAR CONTRAST (08/21/17)


FLUOROSCOPY OF MULT COR ART USING L OSM CONTRAST (08/21/17)


FLUOROSCOPY OF RIGHT AND LEFT HEART USING L OSM CONTRAST (09/13/16)


FLUOROSCOPY OF THORACIC AORTA USING LOW OSMOLAR CONTRAST (08/21/17)


MEASURE OF CARDIAC SAMPL & PRESSURE, L HEART, PERC APPROACH (08/21/17)








Family History: States: No Known Family Hx





- Social History


Hx Tobacco Use: No


Hx Alcohol Use: No


Hx Substance Use: No





- Immunization History


Hx Tetanus Toxoid Vaccination: No


Hx Influenza Vaccination: No


Hx Pneumococcal Vaccination: No





Review Of Systems


Eyes: Negative for: Vision Change


Cardiovascular: Negative for: Chest Pain, Palpitations


Respiratory: Positive for: Other (left sided rib discomfort).  Negative for: 

Shortness of Breath


Neurological: Positive for: Other (LOC).  Negative for: Weakness





Physical Exam





- Physical Exam


Appears: Non-toxic, No Acute Distress, Other (no obvious deformity, speaking in 

full sentences)


Skin: Warm, Dry


Head: Atraumatic, Normacephalic


Eye(s): bilateral: Normal Inspection


Oral Mucosa: Moist


Teeth: Edentulous


Neck: Trachea Midline, Supple


Chest: Symmetrical, Tenderness (right back ribs)


Cardiovascular: Rhythm Regular


Respiratory: No Rales, No Rhonchi, No Wheezing, Other (left rib discomfort. no 

crepitus )


Gastrointestinal/Abdominal: Soft, No Tenderness


Back: No CVA Tenderness


Extremity: Normal ROM, Pedal Edema (trace)


Extremity: Bilateral: Atraumatic, Normal Color And Temperature, Normal ROM


Pulses: Left Dorsalis Pedis: Normal, Right Dorsalis Pedis: Normal


Neurological/Psych: Oriented x3


Gait: Steady





ED Course And Treatment





- Laboratory Results


Result Diagrams: 


                                 03/11/19 19:49





                                 03/11/19 19:49


ECG: Interpreted By Me, Viewed By Me


ECG Rhythm: Sinus Rhythm (85), Nonspecific Changes (occ pvc's)


O2 Sat by Pulse Oximetry: 96 (on RA)


Pulse Ox Interpretation: Normal





- CT Scan/US


  ** CT Head


Other Rad Studies (CT/US): Read By Radiologist, Radiology Report Reviewed


CT/US Interpretation: IMPRESSION:  1. There is generalized parenchymal atrophy n

oted as demonstrated by symmetrical dilatation of ventricles and sulci.  2. 

Chronic periventricular and subcortical microvascular disease is seen.  3. 

Encephalomalacia involving  right frontal and right occipital lobes, compatible 

with old infarcts.  4. Right sphenoid sinusitis is seen.  5. No acute 

intracranial pathology.





  ** CT Chest


Other Rad Studies (CT/US): Read By Radiologist, Radiology Report Reviewed


CT/US Interpretation: IMPRESSION:  1. There is a nondisplaced fracture involving

the posterior right 10th rib.  2. Mild scattered upper lobe predominant 

emphysema is present.  3. Scarring is seen in the right middle lobe, lingula and

lung bases.  4. Several nonobstructing left renal calculi.  5. Bilateral renal 

cysts including a very large incompletely visualized cyst in the right kidney 

measuring approximately 16 x 13 cm.


Progress Note: Plan:  CAT Chest.  CAT Head.  EKG.  Labs.  Urinalysis





Disposition


Discussed With : Ebenezer Mcpherson


Comment: accepted the pt on his service and took over the care at 8:39 PM


Doctor Will See Patient In The: Hospital


Counseled Patient/Family Regarding: Studies Performed, Diagnosis





- Disposition


Disposition: HOSPITALIZED


Disposition Time: 19:22


Condition: FAIR


Forms:  [facility] (English)





- POA


Present On Arrival: None, Falls Or Trauma





- Clinical Impression


Clinical Impression: 


 Syncope, Fracture, rib








- Scribe Statement


The provider has reviewed the documentation as recorded by the Timothy Quinn 





All medical record entries made by the Marcelinaiblety were at my direction and 

personally dictated by me. I have reviewed the chart and agree that the record 

accurately reflects my personal performance of the history, physical exam, 

medical decision making, and the department course for this patient. I have also

personally directed, reviewed, and agree with the discharge instructions and 

disposition.








Decision To Admit





- Pt Status Changed To:


Hospital Disposition Of: Inpatient





- Admit Certification


Admit to Inpatient:: After my assessment, the patient will require 

hospitalization for at least two midnights.  This is because of the severity of 

symptoms shown, intensity of services needed, and/or the medical risk in this 

patient being treated as an outpatient.





- InPatient:


Physician Admission Certification: I certify that this patient requires 2 or 

more midnights of care for the following reason:: After my assessment, the 

patient will require hospitalization for at least two midnights.  This is 

because of the severity of symptoms shown, intensity of services needed, and/or 

the medical risk in this patient being treated as an outpatient.





- .


Bed Request Type: Telemetry


Admitting Physician: Ebenezer Mcpherson


Patient Diagnosis: 


 Syncope, Fracture, rib

## 2019-03-12 LAB
ALBUMIN SERPL-MCNC: 3.7 G/DL (ref 3.5–5)
ALBUMIN/GLOB SERPL: 1.1 {RATIO} (ref 1–2.1)
ALT SERPL-CCNC: 20 U/L (ref 21–72)
AST SERPL-CCNC: 32 U/L (ref 17–59)
BASOPHILS # BLD AUTO: 0 K/UL (ref 0–0.2)
BASOPHILS NFR BLD: 0.5 % (ref 0–2)
BUN SERPL-MCNC: 16 MG/DL (ref 9–20)
CALCIUM SERPL-MCNC: 9.1 MG/DL (ref 8.6–10.4)
EOSINOPHIL # BLD AUTO: 0 K/UL (ref 0–0.7)
EOSINOPHIL NFR BLD: 0.2 % (ref 0–4)
ERYTHROCYTE [DISTWIDTH] IN BLOOD BY AUTOMATED COUNT: 15 % (ref 11.5–14.5)
GFR NON-AFRICAN AMERICAN: 59
HDLC SERPL-MCNC: 52 MG/DL (ref 30–70)
HGB BLD-MCNC: 13 G/DL (ref 12–18)
LDLC SERPL-MCNC: 49 MG/DL (ref 0–129)
LYMPHOCYTES # BLD AUTO: 1.5 K/UL (ref 1–4.3)
LYMPHOCYTES NFR BLD AUTO: 15 % (ref 20–40)
MCH RBC QN AUTO: 32.7 PG (ref 27–31)
MCHC RBC AUTO-ENTMCNC: 32.9 G/DL (ref 33–37)
MCV RBC AUTO: 99.6 FL (ref 80–94)
MONOCYTES # BLD: 0.8 K/UL (ref 0–0.8)
MONOCYTES NFR BLD: 8.4 % (ref 0–10)
NEUTROPHILS # BLD: 7.7 K/UL (ref 1.8–7)
NEUTROPHILS NFR BLD AUTO: 75.9 % (ref 50–75)
NRBC BLD AUTO-RTO: 0.1 % (ref 0–2)
PLATELET # BLD: 194 K/UL (ref 130–400)
PMV BLD AUTO: 7.9 FL (ref 7.2–11.7)
RBC # BLD AUTO: 3.98 MIL/UL (ref 4.4–5.9)
WBC # BLD AUTO: 10.1 K/UL (ref 4.8–10.8)

## 2019-03-12 RX ADMIN — NITROGLYCERIN SCH EA: 20 OINTMENT TOPICAL at 21:44

## 2019-03-12 RX ADMIN — NITROGLYCERIN SCH EA: 20 OINTMENT TOPICAL at 08:25

## 2019-03-12 RX ADMIN — OXYCODONE HYDROCHLORIDE AND ACETAMINOPHEN PRN TAB: 5; 325 TABLET ORAL at 21:55

## 2019-03-12 RX ADMIN — OXYCODONE HYDROCHLORIDE AND ACETAMINOPHEN PRN TAB: 5; 325 TABLET ORAL at 16:55

## 2019-03-12 RX ADMIN — NITROGLYCERIN SCH EA: 20 OINTMENT TOPICAL at 02:42

## 2019-03-12 RX ADMIN — NITROGLYCERIN SCH EA: 20 OINTMENT TOPICAL at 14:26

## 2019-03-12 NOTE — MRI
Date of service: 



03/12/2019



PROCEDURE:  MRI BRAIN WITH AND WITHOUT CONTRAST



HISTORY:

SYNCOPE



COMPARISON:

Comparison made with CT scan and MRI brain dated 03/11/2019 and 

09/13/2016 respectively. 



TECHNIQUE:

Multiplanar, multisequence MR images of the brain were obtained with 

and without intravenous contrast enhancement.



FINDINGS:

Note that the study is limited by artifact.  



HEMORRHAGE:

No acute parenchymal, subarachnoid or extra-axial hemorrhage.  No 

evidence of hemosiderin deposition identified on gradient echo 

weighted sequence. 



DWI:

No evidence of an acute or early subacute infarction.



BRAIN PARENCHYMA:

Redemonstrated is chronic infarct within the distribution of the 

right posterior cerebral artery better visualized as compared to 

recent CT scan and new since prior MRI.  



Chronic right posterior frontoparietal subcortical white matter 

infarct with moderate diffuse and confluent chronic periventricular 

white matter ischemic changes which extend peripherally into the deep 

and subcortical white matter both cerebral hemispheres.  Scattered 

chronic bilateral basal nuclei lacunar type infarcts.  Small chronic 

infarct change in the left cerebellum also again noted. 



Moderate generalized volume loss. 



No enhancing parenchymal nor extra-axial masses or collections seen 

on this noncontrast study.  No evidence of unusual meningeal 

enhancement 



ENHANCEMENT:

No abnormal intracranial enhancement as detailed above..



VENTRICLES:

No obstructive



CRANIUM:

Unremarkable.



ORBITS:

Changes of left-sided cataract surgery again noted. 



PARANASAL SINUSES/MASTOIDS:

Polypoid like mucosal thickening right chamber sphenoid sinus..



VASCULAR SYSTEM:

Visualized major vascular flow voids at skull base are patent



OTHER FINDINGS:

None .



IMPRESSION:

Limited exam as above. 



Redemonstrated is chronic infarct within the distribution of the 

right posterior cerebral artery better visualized as compared to 

recent CT scan and new since prior MRI.  



Chronic right posterior frontoparietal subcortical white matter 

infarct with moderate diffuse and confluent chronic periventricular 

white matter ischemic changes which extend peripherally into the deep 

and subcortical white matter both cerebral hemispheres.  Scattered 

chronic bilateral basal nuclei lacunar type infarcts.  Small chronic 

infarct change in the left cerebellum also again noted. 



Moderate generalized volume loss. 



No enhancing parenchymal nor extra-axial masses or collections seen 

on this noncontrast study.  No evidence of unusual meningeal 

enhancement

## 2019-03-12 NOTE — CARD
--------------- APPROVED REPORT --------------





Date of service: 03/11/2019



EKG Measurement

Heart [id number]

AL 196P19

[id number]

[id number]

[id number]



<Conclusion>

Sinus rhythm with frequent premature ventricular complexes and fusion 

complexes

Possible Left atrial enlargement

Left axis deviation

Nonspecific T wave abnormality

Prolonged QT

Abnormal ECG

## 2019-03-12 NOTE — CT
Date of service: 



03/11/2019



PROCEDURE:  CT HEAD WITHOUT CONTRAST.



HISTORY:

syncope



COMPARISON:

Comparison made with MRI of the brain dated 9/13/20 16.



TECHNIQUE:

Axial computed tomography images were obtained through the head/brain 

without intravenous contrast.  



Radiation dose:



Total exam DLP = 1133.13 mGy-cm.



This CT exam was performed using one or more of the following dose 

reduction techniques: Automated exposure control, adjustment of the 

mA and/or kV according to patient size, and/or use of iterative 

reconstruction technique.



FINDINGS:



HEMORRHAGE:

No acute parenchymal, subarachnoid or extra-axial hemorrhage. 



BRAIN:

There is a discrete chronic right PCA territory infarct which is new 

since prior MRI of the brain.  Also again noted is a discrete right 

superior frontal subcortical infarct.  There is moderate to fairly 

significant diffuse/confluent chronic white matter ischemic changes 

seen extending peripherally into the deep and subcortical white 

matter both cerebral hemispheres which appears to have progressed 

since prior study..  Small chronic left cerebellar infarct is also 

present.  Note that the possibility of a small hyperacute infarct 

cannot be excluded on this study and therefore clinical correlation 

recommended. 



Moderate generalized volume loss. 



No obvious parenchymal mass or collection seen on this noncontrast 

study. 



Minor vascular calcifications both carotid siphons 



VENTRICLES:

No obstructive hydrocephalus. 



CALVARIUM:

Unremarkable.



PARANASAL SINUSES:

Small focal area polypoid like mucosal thickening right chamber 

sphenoid sinus



MASTOID AIR CELLS:

Unremarkable as visualized. No inflammatory changes.



OTHER FINDINGS:

Redemonstrated are changes of left-sided cataract surgery



IMPRESSION:

Chronic right PCA territory infarct new since prior study.  Chronic 

right superior frontal subcortical infarct and small left cerebellar 

chronic infarct unchanged.  Moderate to significant chronic white 

matter ischemic changes progressed since prior study. 



Moderate generalized volume loss.

## 2019-03-12 NOTE — CARD
--------------- APPROVED REPORT --------------





Date of service: 03/12/2019



EXAM: Two-dimensional and M-mode echocardiogram with Doppler and 

color Doppler.



Other Information 

Quality : GoodRhythm : 



INDICATION

Chest Pain Syncope 



RISK FACTORS

Hypertension 

Hyperlipidemia



2D DIMENSIONS 

IVSd1.1   (0.7-1.1cm)LVDd5.6   (3.9-5.9cm)

PWd1.2   (0.7-1.1cm)LA Knipcs95   (18-58mL)

LVDs3.5   (2.5-4.0cm)FS (%) 37.3   %

LVEF (%)66.6   (>50%)LVEF (Cain's)52.29 %



M-Mode DIMENSIONS 

Left Atrium (MM)2.61   (2.5-4.0cm)IVSd1.00   (0.7-1.1cm)

Aortic Root4.00   (2.2-3.7cm)LVDd6.25   (4.0-5.6cm)

Aortic Cusp Exc.2.00   (1.5-2.0cm)PWd0.94   (0.7-1.1cm)

FS (%) 30   %LVDs4.40   (2.0-3.8cm)

LVEF (%)56   (>50%)



Aortic Valve

AI P 1/2 [id number]



Mitral Valve

MV E Rougxjxe60.2cm/sMV A Fufrftuq094.0cm/sE/A ratio0.6



TDI

Lateral E' Peak V6.29cm/sMedial E' Peak V5.13cm/sE/Lateral E'12.1

E/Medial E'14.9



 LEFT VENTRICLE 

The left ventricle is normal size.

There is normal left ventricular wall thickness.

Left ventricle systolic function is low normal.

The Ejection Fraction is 50-55%.

There is normal LV segmental wall motion.

Transmitral Doppler flow pattern is Grade I-abnormal relaxation 

pattern.

There is no ventricular septal defect visualized.



 RIGHT VENTRICLE 

The right ventricle is normal size.

The right ventricular systolic function is normal.



 ATRIA 

The left atrium is mildly dilated. 

The right atrium size is normal.



 AORTIC VALVE 

The aortic valve is mildly to moderately sclerotic.

The aortic valve is tri-cuspid.

There is moderate aortic regurgitation. 

There is no aortic valvular stenosis. 



 MITRAL VALVE 

The mitral valve is normal in structure.

There is no evidence of mitral valve prolapse.

Mitral regurgitation is trace.



 TRICUSPID VALVE 

The tricuspid valve is normal in structure.

There is trace tricuspid regurgitation.

There is no pulmonary hypertension.



 PULMONIC VALVE 

The pulmonary valve is normal in structure.

There is trace pulmonic valvular regurgitation. 



 GREAT VESSELS 

The aortic root is normal in size.

The ascending aorta is normal in size.

The IVC is normal in size and collapses >50% with inspiration.



 PERICARDIAL EFFUSION 

There is no pericardial effusion.



<Conclusion>

Left ventricle systolic function is low normal.

The Ejection Fraction is 50-55%.

Transmitral Doppler flow pattern is Grade I-abnormal relaxation 

pattern.

There is moderate aortic regurgitation.

Mitral regurgitation is trace.

## 2019-03-12 NOTE — CON
DATE:  03/12/2019



CARDIOLOGY CONSULTATION



REASON FOR CONSULTATION:  Syncopal episode.



HISTORY OF PRESENT ILLNESS:  The patient is a 79-year-old male, who has a

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

posterior cerebral artery, was admitted because of syncopal episode.  The

patient stated that he was sitting in front of his computer when he felt a

little woozy.  He had to stand up and open the kitchen door because he

heard his friend downstairs knocking on his door.  At this point, the

patient collapsed to the floor and lost consciousness.  The patient assumed

that he hit his left side of the chest against a chair, and he is

experiencing left-sided rib pain.  The patient has no known history of

coronary artery disease.  His cardiac catheterization was performed twice

in 2016 and 2017 which revealed unremarkable coronary circulation and

normal ejection fraction.  An echo at that time revealed mild aortic

insufficiency.  The patient does not recall experiencing palpitation during

the fall.  Denies injuring his head and denies any incontinence or tongue

biting.



SOCIAL HISTORY:  The patient quit smoking 40 years ago.  He lives by

himself.  His friend lives in the first floor apartment.



MEDICATIONS:  Aspirin 81 mg once a day, Lopressor 25 mg once a day, and

Zestril 10 mg once a day.



REVIEW OF SYSTEMS:  No nausea or vomiting.  No fevers or chills.  No

retrosternal chest pain and no palpitation.



PHYSICAL EXAMINATION:

GENERAL:  The patient is an elderly male, who does not appear to be in

acute distress.

VITAL SIGNS:  Blood pressure 129/82, heart rate 77, temperature 99.1,

respirations 20.

HEENT:  Normocephalic.

CHEST:  Clear.

HEART:  S1 and S2, regular.

ABDOMEN:  Soft.

EXTREMITIES:  No edema.



LABORATORY STUDIES:  Today's hemoglobin and hematocrit are 13 and 39.7. 

White count 10.1, platelet count 194,000.



Today's SMA-7:  Sodium 137, potassium 3.7, chloride 99, CO2 is 27, glucose

108, BUN 16, creatinine 1.2.



Two sets of troponins are negative.



Lipid profile and thyroid profile are within normal limits.



Head CT scan without contrast; chronic right posterior cerebral artery

territory infarct, chronic right superior frontal subcortical infarct, a

small left cerebellar chronic infarct unchanged.



Chest CT scan revealed evidence of left 9th and possibly 10th rib fracture

deformities nondisplaced.  Scattered emphysematous changes.  Mild bibasilar

atelectasis.  Heterogeneous appearing thyroid gland and recommend

correlation with outpatient thyroid ultrasound.



EKG revealed sinus rhythm with frequent PVCs and fusion complexes, possible

left atrial enlargement, left axis deviation, nonspecific T-wave changes,

prolonged QT interval.



Echocardiography study revealed normal ejection fraction and moderate

aortic insufficiency.



ASSESSMENT:

1.  Syncopal episode.

2.  Left ninth and tenth rib fractures.

3.  Old right posterior cerebral artery territory infarct.

4.  Hypertension.

5.  Emphysema.



RECOMMENDATIONS:  Continue current aspirin 81 mg once a day, Lopressor 25

mg twice a day, and Zestril 10 mg once a day.  I will follow carotid

Doppler results.  The case was discussed with Dr. Ebenezer Mcpherson, primary

physician.  The patient will be scheduled for brain MRI.





__________________________________________

Mick Jain MD



DD:  03/12/2019 13:14:01

DT:  03/12/2019 13:19:45

Job # [id number]

## 2019-03-12 NOTE — CT
Date of service: 03/11/2019



CT chest without IV contrast 



Indication: att left ribs,  fall, pain



Technique: 



Contiguous axial images were obtained through the chest without 

intravenous contrast enhancement. Sagittal and coronal 

reconstructions were generated and reviewed. 



This CT exam was performed using 1 or more of the following dose 

reduction techniques: Automated exposure control, adjustment of the 

MAA and/or kV according to patient size, and/or use of iterative 

reconstruction technique. 







Radiation dose (DLP):  1023.84 MGy-cm. 



Comparison: Contrast-enhanced chest CT performed 8/20/17



Findings: 



Visualized portions of the inferior thyroid gland appear 

heterogeneous. The enhance mediastinal and hilar vascular structures 

appear grossly unremarkable.  The heart appears within normal limits 

of size.  Dense atherosclerotic calcifications of the aorta.  Sub cm 

mediastinal lymph nodes, nonspecific. 



Mild scattered emphysematous changes. Mild bibasilar atelectasis. No 

focal consolidation. No pleural effusion. No pneumothorax. 



Limited visualization of the noncontrast upper abdomen: Partially 

imaged cystic mass in the right upper quadrant measuring greater than 

14 cm in cc dimension and approximately 12.8 cm in transverse 

dimension.  5.1 x 5.0 cm left renal cyst.  Nonobstructing left renal 

calculi. 



Degenerative changes of the spine including confluent anterior 

osteophyte formation.  Mild kyphosis.  Left 9th, and possibly 10th 

rib fracture deformities. 



Impression: 



Evidence of left 9th and possibly 10th rib fracture deformities, 

nondisplaced. 



Scattered emphysematous changes. Mild bibasilar atelectasis. 



Heterogeneous appearance of the thyroid gland. Recommend correlation 

with outpatient thyroid ultrasound if indicated. 



Limited visualization of the noncontrast upper abdomen: Partially 

imaged cystic mass in the right upper quadrant measuring greater than 

14 cm in cc dimension and approximately 12.8 cm in transverse 

dimension.  5.1 x 5.0 cm left renal cyst.  Nonobstructing left renal 

calculi. 



Additional findings as above. 



Preliminary impression was provided by [facility].

## 2019-03-13 PROCEDURE — 4A02XFZ MEASUREMENT OF CARDIAC RHYTHM, EXTERNAL APPROACH: ICD-10-PCS | Performed by: INTERNAL MEDICINE

## 2019-03-13 PROCEDURE — 4A03XB1 MEASUREMENT OF ARTERIAL PRESSURE, PERIPHERAL, EXTERNAL APPROACH: ICD-10-PCS | Performed by: INTERNAL MEDICINE

## 2019-03-13 RX ADMIN — NITROGLYCERIN SCH EA: 20 OINTMENT TOPICAL at 02:50

## 2019-03-13 RX ADMIN — NITROGLYCERIN SCH EA: 20 OINTMENT TOPICAL at 08:32

## 2019-03-13 RX ADMIN — NITROGLYCERIN SCH EA: 20 OINTMENT TOPICAL at 13:57

## 2019-03-13 RX ADMIN — OXYCODONE HYDROCHLORIDE AND ACETAMINOPHEN PRN TAB: 5; 325 TABLET ORAL at 16:57

## 2019-03-13 RX ADMIN — NITROGLYCERIN SCH EA: 20 OINTMENT TOPICAL at 22:04

## 2019-03-13 RX ADMIN — OXYCODONE HYDROCHLORIDE AND ACETAMINOPHEN PRN TAB: 5; 325 TABLET ORAL at 08:31

## 2019-03-13 RX ADMIN — NITROGLYCERIN SCH: 20 OINTMENT TOPICAL at 14:00

## 2019-03-13 RX ADMIN — OXYCODONE HYDROCHLORIDE AND ACETAMINOPHEN PRN TAB: 5; 325 TABLET ORAL at 22:06

## 2019-03-13 NOTE — HP
HISTORY OF PRESENT ILLNESS:  This patient is a 79-year-old male.  The

patient has history of hypertension, arthritis, and COPD.  The patient came

to the emergency room because the patient had syncopal episode this morning

while the patient was on the computer, but the patient just passed out, and

when the patient stated that at first had a seizure, the patient stated

that he felt dizzy, and it feels like _____ in his left side of the thorax.

Now, the patient denies any shortness of breath or palpitation, however,

the patient is still complaining of some pain to the left side of the chest

with breathing.



ALLERGIES:  THE PATIENT HAS NO KNOWN ALLERGIES.



SOCIAL HISTORY:  The patient has history of smoking, but has stopped.  No

history of alcohol abuse, and also the patient has history of arthritis.



FAMILY HISTORY:  There is no inherited disease.



REVIEW OF SYSTEMS:  The patient has been having some left-sided pain in the

lower back, lower portion of the thorax which increased with deep breathing

and denies any shortness of breath at rest.

CARDIOVASCULAR:  The patient denies any chest pain and no palpitation.

GASTROINTESTINAL:  No nausea or vomiting.

GENITOURINARY:  The patient admits to having some urinary stress

incontinence.

NEUROLOGY:  The patient is complaining of pain to the left side and denies

any dizziness at this point.



PHYSICAL EXAMINATION:

GENERAL:  The patient is alert, awake, and oriented.  The patient denies

any loss of memory.  (04:22).  The patient is obese.

VITAL SIGNS:  Blood pressure 135/70, pulse is 89, respirations 20, and

temperature is 98.8.

HEENT:  Normocephalic, but nod.  The patient is wearing denture.

NECK:  Supple.  No JVD.  No carotid bruit.

LUNGS:  Clear.  It has some fine rales at the bases, and there is

tenderness with pressure to the posterior lateral aspect of the lower ribs.

ABDOMEN:  Soft.  Positive bowel sounds.  No tenderness.

EXTREMITIES:  There is tenderness in the right hip with motion which is

also old.



LABORATORY DATA:  The patient had tests done.  The labs showed that WBC was

11.9, hemoglobin 13, hematocrit 44.5, and platelet level of 98.  Chemistry

showed that sodium was 139, potassium 4.1, chloride 101, bicarb is 27, BUN

is 16, creatinine 1.4, and GFR is 59, and glucose 107, calcium 9.8, AST 25,

ALT 21, alkaline phosphatase 88.  Also, the patient had a head CT that

showed a chronic right PCA territory infarct with his prior study, chronic

right superior frontal subcortical infarct, and small left cerebellar

chronic infarct unchanged.  _____ he gets chronic white matter ischemic

changes, progressed since prior study.  _____.  The chest CT showed that

_____ ileus.  The enhancement just in hilar vascular structures have been

grossly remarkable.  The heart appeared within normal limits.  There were

scattered emphysematous changes and mild bibasilar atelectasis.  There was

evidence of a an left ninth and partially tenth rib fracture deformities,

nondisplaced.



ASSESSMENT AND PLAN:  The patient is admitted with diagnosis of syncope,

rib fracture, hypertension, arthritis, obesity, dyslipidemia, and chronic

obstructive pulmonary disease.  The patient had a consult with Dr. Jain and also the patient had a consult with Dr. Grant, Neurology. 

_____ markers should be ordered, like echocardiogram, carotid Doppler, and

MRI/MRA of the brain.





__________________________________________

Ebenezer Mcpherson MD





DD:  03/12/2019 16:51:41

DT:  03/12/2019 21:22:50

Job # [id number]

## 2019-03-13 NOTE — CP.PCM.CON
History of Present Illness





- History of Present Illness


History of Present Illness: 











Cardiology/EP Consult





Re: Syncope





Chart/imaging reviewed





Patient interviewed and examined





79 year old admitted with a history of fall and loss of consciousness for 

minutes while sitting on a Computer 


This was preceded by dizziness and feeling 'hot'' denied headache vomiting 

seizures tongue bite incontinence; chest pain


Palpitations dyspnea


He had a remote episode similarly in the past





Past medical:


Systemic hypertension


Hyperlipidemia


Cerbrovascular disease TIA





Medications: reviewed


Allergies: none


Denied smoking; alcohol abuse or substance use





Exam


No distress


No pallor


Normal venous pressures


No neck bruits


?PMI


Normal heart sounds


No edema





EKG: sinus; QTc ~ 500ms; Left atrial enlargement PVC





Echo: 


LV ejection fraction 35-40%


Aortic sclerosis





CT scan: calcified coronaries


Labs:


K+/Mg++/Ca++4/2./9.8


Troponin 0.01





Assessment and Plan





Mr. Richardson presented with an episode of transient loss of consciousness 

suggestive of a hemodynamic collaspe (Cf. Neurological event)


The mechanism is unclear; the frequent PVC and Left ventricular systolic 

dysfunction and coronary disease on the Echo/CT chest and the QTc interval ALL 

suggests an underlying myocardial substrate susceptible to ventricular 

tachyarrhythmias (VT VFib)


Ideally needs a Tilt table study, Cardiac Cath EP study and a loop recorder if 

the work up is negative


DW patient procedures prognosis risks options; he verbalized understanding





MARIELA Mcpherson





Past Patient History





- Infectious Disease


Hx of Infectious Diseases: None





- Past Medical History & Family History


Past Medical History?: Yes





- Past Social History


Smoking Status: Never Smoked





- CARDIAC


Hx Hypercholesterolemia: Yes


Hx Hypertension: Yes





- PULMONARY


Hx Respiratory Disorders: No





- NEUROLOGICAL


Hx Transient Ischemic Attacks (TIA): Yes (2014)





- HEENT


Hx HEENT Problems: Yes


Hx Blind: Yes (L Eye)





- RENAL


Hx Chronic Kidney Disease: No





- ENDOCRINE/METABOLIC


Hx Endocrine Disorders: No





- HEMATOLOGICAL/ONCOLOGICAL


Hx Blood Disorders: No





- INTEGUMENTARY


Hx Dermatological Problems: No





- MUSCULOSKELETAL/RHEUMATOLOGICAL


Hx Falls: No





- GASTROINTESTINAL


Hx Gastrointestinal Disorders: No





- GENITOURINARY/GYNECOLOGICAL


Hx Genitourinary Disorders: No





- PSYCHIATRIC


Hx Substance Use: No





- SURGICAL HISTORY


Hx Surgeries: Yes


Hx Eye Surgery: Yes (left eye)


Other/Comment: left eye surgery s/p blunt trauma





- ANESTHESIA


Hx Anesthesia: Yes


Hx Anesthesia Reactions: No


Hx Malignant Hyperthermia: No





Meds


Allergies/Adverse Reactions: 


                                    Allergies











Allergy/AdvReac Type Severity Reaction Status Date / Time


 


No Known Allergies Allergy   Verified 08/19/17 09:40














- Medications


Medications: 


                               Current Medications





Aspirin (Ecotrin)  81 mg PO DAILY [facility]


   Last Admin: 03/13/19 09:59 Dose:  81 mg


Lidocaine (Lidoderm)  1 ea TD DAILY [facility]


   Last Admin: 03/13/19 14:32 Dose:  1 ea


Lisinopril (Zestril)  10 mg PO DAILY [facility]


   Last Admin: 03/13/19 09:59 Dose:  10 mg


Metoprolol Tartrate (Lopressor)  25 mg PO BID [facility]


   Last Admin: 03/13/19 17:02 Dose:  25 mg


Nitroglycerin (Nitro-Bid 2% Oint)  0.5 ea TOP Q6H [facility]


   Last Admin: 03/13/19 14:00 Dose:  Not Given


Oxycodone/Acetaminophen (Percocet 5/325 Mg Tab)  1 tab PO Q6H PRN


   PRN Reason: Pain, moderate (4-7)


   Stop: 03/14/19 16:32


   Last Admin: 03/13/19 16:57 Dose:  1 tab











Results





- Vital Signs


Recent Vital Signs: 


                                Last Vital Signs











Temp  97.9 F   03/13/19 15:35


 


Pulse  62   03/13/19 16:00


 


Resp  20   03/13/19 15:35


 


BP  133/74   03/13/19 17:02


 


Pulse Ox  94 L  03/13/19 15:35














- Labs


Result Diagrams: 


                                 03/12/19 09:23





                                 03/12/19 08:43

## 2019-03-13 NOTE — PN
DATE:  03/13/2019



SUBJECTIVE:  The patient denies any dizziness or palpitation.  No reported

arrhythmia.



PHYSICAL EXAMINATION:

VITAL SIGNS:  Blood pressure 124/70, heart rate 73, temperature 98.2,

respirations 20.

HEENT:  Normocephalic.

CHEST:  Clear.

HEART:  S1 and S2.  Regular.

ABDOMEN:  Soft.

EXTREMITIES:  Trace leg edema.



LABORATORY DATA:  Echocardiographic study performed yesterday revealed

ejection fraction in the range of 50 to 55% with grade 1 abnormal

relaxation pattern and moderate aortic insufficiency.



A brain MRI revealed chronic infarct within the distribution of the right

posterior cerebral artery, chronic posterior frontoparietal subcortical

white matter infarct with chronic periventricular white matter ischemic

changes.  Chronic bilateral basal nuclei lacunar type infarcts and small

chronic infarct changes in the left cerebellum.



ASSESSMENT:

1.  Syncopal episode.

2.  Multiple infarcts involving the right posterior cerebral artery

territory, chronic right posterior frontoparietal subcortical infarct. 

Bilateral basal nuclei lacunar infarcts and small chronic infarct in the

left cerebellum.

3.  Moderate aortic insufficiency.

4.  Diastolic left ventricular dysfunction.

5.  Hypertension.



RECOMMENDATIONS:  Continue aspirin 81 mg once a day, Lopressor 25 mg twice

a day, Zestril 10 mg once a day.  I will follow EEG results.







__________________________________________

Mick Jain MD





DD:  03/13/2019 12:14:27

DT:  03/13/2019 12:17:59

Job # [id number]

## 2019-03-13 NOTE — VASCLAB
Date of service: 



03/12/2019



PROCEDURE:  Carotid Duplex Exam.



HISTORY:

SYNCOPE



COMPARISON:

None available. 



TECHNIQUE:

Grayscale and duplex Doppler evaluation of the cervical carotid and 

vertebral arteries were performed. The common carotid, carotid 

bifurcations and cervical Internal Carotid Artery (ICA) and proximal 

External Carotid Artery (ECA) were evaluated.  The vertebral arteries 

were evaluated for gross patency and flow direction. 



Report prepared by  Kirit Gallegos, BS, RVT



FINDINGS:



RIGHT CAROTID ARTERIES:

1. Common Carotid Artery: No significant focal plaque formation of 

the right common carotid artery. Maximum Peak Systolic velocity: 89 

cm/sec: End-diastolic velocity 12 cm/sec.



2. Carotid Bifurcation:  plaque formation. Maximum Peak Systolic 

velocity: 69 cm/sec: End-diastolic velocity 9 cm/sec.  



3. Internal Carotid Artery:    Plaque description:   



3.1. Proximal Segment: Peak systolic velocity 107 cm/sec: 

End-diastolic velocity  12 cm/sec - % stenosis  0-15%



3.2. Middle Segment:    Peak systolic velocity 53 cm/sec: 

End-diastolic velocity  17  cm/sec - % stenosis 0-15%



3.3. Distal Segment:     Peak systolic velocity 65 cm/sec: 

End-diastolic velocity  18  cm/sec - % stenosis 0-15%



4. External Carotid Artery: No significant focal plaque formation. 

Peak systolic velocity 125 cm/sec



5. ICA/CCA Ratio: 1.2



LEFT CAROTID ARTERIES:

1. Common Carotid Artery: No significant focal plaque formation of 

the left common carotid artery. Maximum Peak Systolic velocity: 125 

cm/sec: End-diastolic velocity 19 cm/sec.



2. Carotid Bifurcation:  plaque formation. Maximum Peak Systolic 

velocity: 131 cm/sec: End-diastolic velocity 19 cm/sec.



3. Internal Carotid Artery:      Plaque description:  



3.1. Proximal Segment: Peak systolic velocity 75 cm/sec: 

End-diastolic velocity 19 cm/sec - % stenosis 0-15%



3.2. Middle Segment:    Peak systolic velocity 75 cm/sec: 

End-diastolic velocity 24 cm/sec - % stenosis 0-15%



3.3. Distal Segment:     Peak systolic velocity 61 cm/sec: 

End-diastolic velocity 21 cm/sec - % stenosis 0-15%



4. External Carotid Artery: No significant focal plaque formation. 

Peak systolic velocity 93 cm/sec



5. ICA/CCA Ratio: 1.0



VERTEBRAL ARTERIES:

1. Right Vertebral Artery: The right vertebral artery flow direction 

is  antegrade.



2. Left Vertebral Artery:   The left vertebral artery flow direction 

is antegrade.



OTHER FINDINGS:

1. Right Brachial Blood pressure: 120 mmHg.



2. Left Brachial Blood pressure:  mmHg.



3. No atherosclerotic calcification present



IMPRESSION:

RIGHT:  Duplex scan does not suggest hemodynamically significant 

stenosis of the right extracranial carotid arteries.  



LEFT:  Duplex scan does not suggest hemodynamically significant 

stenosis of the left extracranial carotid arteries.

## 2019-03-14 LAB
ALBUMIN SERPL-MCNC: 3.9 G/DL (ref 3.5–5)
ALBUMIN/GLOB SERPL: 1.1 {RATIO} (ref 1–2.1)
ALT SERPL-CCNC: 25 U/L (ref 21–72)
AST SERPL-CCNC: 29 U/L (ref 17–59)
BASOPHILS # BLD AUTO: 0.1 K/UL (ref 0–0.2)
BASOPHILS NFR BLD: 0.5 % (ref 0–2)
BUN SERPL-MCNC: 22 MG/DL (ref 9–20)
CALCIUM SERPL-MCNC: 9.6 MG/DL (ref 8.6–10.4)
EOSINOPHIL # BLD AUTO: 0.2 K/UL (ref 0–0.7)
EOSINOPHIL NFR BLD: 1.6 % (ref 0–4)
EOSINOPHIL NFR BLD: 2 % (ref 0–4)
ERYTHROCYTE [DISTWIDTH] IN BLOOD BY AUTOMATED COUNT: 14.7 % (ref 11.5–14.5)
GFR NON-AFRICAN AMERICAN: 49
HDLC SERPL-MCNC: 52 MG/DL (ref 30–70)
HGB BLD-MCNC: 13.1 G/DL (ref 12–18)
LDLC SERPL-MCNC: 60 MG/DL (ref 0–129)
LYMPHOCYTE: 13 % (ref 20–40)
LYMPHOCYTES # BLD AUTO: 1 K/UL (ref 1–4.3)
LYMPHOCYTES NFR BLD AUTO: 9.2 % (ref 20–40)
MCH RBC QN AUTO: 32.6 PG (ref 27–31)
MCHC RBC AUTO-ENTMCNC: 32.5 G/DL (ref 33–37)
MCV RBC AUTO: 100.3 FL (ref 80–94)
MONOCYTE: 2 % (ref 0–10)
MONOCYTES # BLD: 0.3 K/UL (ref 0–0.8)
MONOCYTES NFR BLD: 3 % (ref 0–10)
NEUTROPHILS # BLD: 9 K/UL (ref 1.8–7)
NEUTROPHILS NFR BLD AUTO: 83 % (ref 50–75)
NEUTROPHILS NFR BLD AUTO: 85.7 % (ref 50–75)
NRBC BLD AUTO-RTO: 0 % (ref 0–2)
PLATELET # BLD EST: NORMAL 10*3/UL
PLATELET # BLD: 186 K/UL (ref 130–400)
PMV BLD AUTO: 7.6 FL (ref 7.2–11.7)
RBC # BLD AUTO: 4.02 MIL/UL (ref 4.4–5.9)
TOTAL CELLS COUNTED BLD: 100
WBC # BLD AUTO: 10.5 K/UL (ref 4.8–10.8)

## 2019-03-14 RX ADMIN — NITROGLYCERIN SCH EA: 20 OINTMENT TOPICAL at 21:08

## 2019-03-14 RX ADMIN — OXYCODONE HYDROCHLORIDE AND ACETAMINOPHEN PRN TAB: 5; 325 TABLET ORAL at 09:30

## 2019-03-14 RX ADMIN — NITROGLYCERIN SCH EA: 20 OINTMENT TOPICAL at 08:09

## 2019-03-14 RX ADMIN — NITROGLYCERIN SCH EA: 20 OINTMENT TOPICAL at 02:44

## 2019-03-14 RX ADMIN — NITROGLYCERIN SCH EA: 20 OINTMENT TOPICAL at 14:59

## 2019-03-14 NOTE — CT
Date of service: 



03/14/2019



PROCEDURE:  CT Chest with contrast (Pulmonary Angiogram)



HISTORY:

r/o PE



COMPARISON:

None available.



TECHNIQUE:

Axial computed tomography images were obtained of the chest in the 

pulmonary arterial phase of enhancement. Coronal and sagittal 

reformatted images were created and reviewed.



Intravenous contrast dose: 



Radiation dose:



Total exam DLP = 653.78 mGy-cm.



This CT exam was performed using one or more of the following dose 

reduction techniques: Automated exposure control, adjustment of the 

mA and/or kV according to patient size, and/or use of iterative 

reconstruction technique.



FINDINGS:



PULMONARY ARTERIES:

Unremarkable. No pulmonary embolism. 



AORTA:

No acute findings. No thoracic aortic aneurysm. Mild tortuosity of 

the aorta.



LUNGS:

Bibasilar infiltrates.  Additional scattered areas of bilateral 

discoid atelectasis. 



PLEURAL SPACES:

Unremarkable. No effusion or pneumothorax. 



HEART:

Unremarkable. No cardiomegaly. No significant pericardial effusion. 



LYMPH NODES:

No lymphadenopathy.



BONES, CHEST WALL:

Unremarkable. No fracture or destructive lesion 



OTHER FINDINGS:

Incompletely imaged probable simple bilateral renal cysts.  Probable 

benign right axillary lymph nodes. 



IMPRESSION:

Bibasilar infiltrates.  No pulmonary embolism.

## 2019-03-14 NOTE — PN
DATE:  03/14/2019



SUBJECTIVE:  The patient denies any chest pain.  He is in sinus tachycardia

on the monitor with occasional PVCs with fusion.



OBJECTIVE:

VITAL SIGNS:  Blood pressure 146/87, heart rate 76, temperature 98.1, and

respirations 20.

HEENT:  Normocephalic.

CHEST:  Clear.

HEART:  S1 and S2, regular.

EXTREMITIES:  Trace leg edema.



LABORATORY DATA:  Today's SMA-7 is within normal limits except for chloride

of 97 and BUN of 22.



Today's hemoglobin and hematocrit 15.1 and 40.3.  White count and platelet

count are within normal limits.



Official report of carotid Doppler does not suggest hemodynamically

significant disease.



Tilt table performed by Dr. Otero, the electrophysiologist today was

reported to be negative.  The patient was offered an event recorder, but he

refused.



ASSESSMENT:

1.  Syncopal episode.

2.  History of multiple cerebrovascular accidents involving the right

posterior cerebral artery territory, the right posterior frontoparietal

subcortical area, bilateral basal ganglia, and the left cerebellum.

3.  Moderate aortic insufficiency.

4.  Hypertension.

5.  Diastolic heart dysfunction.



RECOMMENDATIONS:  Case was discussed with the nurse practitioner.  Continue

aspirin 81 mg once a day, Lopressor 25 mg twice a day, and Zestril 10 mg

once a day.  Followup chest CT angio that was performed today.  The patient

still refuses loop recorder.







__________________________________________

Mick Jain MD



DD:  03/14/2019 14:42:24

DT:  03/14/2019 14:44:48

Job # [id number]

## 2019-03-14 NOTE — PN
DATE:  03/13/2019



SUBJECTIVE:  Today, the patient is alert and oriented.  Still complaining

of some pain to the posterior left side of the chest at the site of the

nondisplaced fracture of the ribs.  The patient denies any dizziness or

shortness of breath.



PHYSICAL EXAMINATION:

VITAL SIGNS:  The patient has blood pressure of 133/74, pulse 68,

respirations 20, and temperature 97.9.

NECK:  Supple.  No JVD.

LUNGS:  There is some rhonchi noted, which is old.

HEART:  Regular rate and rhythm and positive murmur.

ABDOMEN:  Soft, obese, nontender.  No palpable mass.

EXTREMITIES:  There is no edema, but there is some tenderness at the right

hip.

NEURO:  There is an unsteady gait.



LABORATORY DATA:  The patient's blood work was done yesterday with WBC of

10.1, hemoglobin 13, hematocrit 39.7, and platelets were 194.



PLAN:  Plan was to have the patient a tilt table test, _____ was called and

has done this afternoon the tilt table test, and it was negative as per Dr. Otero, the cardiologist, but also he was so urgent to put a loop recorder. 

I have discussed with the patient this afternoon, and the patient is not

agreeable yet, so we discussed that again with the patient in the morning. 

The case was reviewed and discussed with Celia Navarrete, the nurse

practitioner in the morning and this afternoon with Dr. Otero,

cardiologist.





__________________________________________

Ebenezer Mcpherson MD





DD:  03/13/2019 21:14:22

DT:  03/14/2019 0:31:17

Job # [id number]

## 2019-03-15 LAB
ARTERIAL BLOOD GAS HEMOGLOBIN: 12.6 G/DL (ref 11.7–17.4)
ARTERIAL BLOOD GAS O2 SAT: 93.1 % (ref 95–98)
ARTERIAL BLOOD GAS PCO2: 28 MM/HG (ref 35–45)
ARTERIAL BLOOD GAS TCO2: 19.9 MMOL/L (ref 22–28)
ARTERIAL PATENCY WRIST A: (no result)
BNP SERPL-MCNC: 2100 PG/ML (ref 0–900)
BUN SERPL-MCNC: 16 MG/DL (ref 9–20)
CALCIUM SERPL-MCNC: 8.9 MG/DL (ref 8.6–10.4)
GFR NON-AFRICAN AMERICAN: 59
HCO3 BLDA-SCNC: 21.7 MMOL/L (ref 21–28)
PH BLDA: 7.44 [PH] (ref 7.35–7.45)
PO2 BLDA: 57 MM/HG (ref 80–100)

## 2019-03-15 RX ADMIN — NITROGLYCERIN SCH: 20 OINTMENT TOPICAL at 21:46

## 2019-03-15 RX ADMIN — TAZOBACTAM SODIUM AND PIPERACILLIN SODIUM SCH MLS/HR: 375; 3 INJECTION, SOLUTION INTRAVENOUS at 22:00

## 2019-03-15 RX ADMIN — SODIUM CHLORIDE ONE MG: 900 INJECTION, SOLUTION INTRAVENOUS at 10:14

## 2019-03-15 RX ADMIN — TAZOBACTAM SODIUM AND PIPERACILLIN SODIUM SCH MLS/HR: 375; 3 INJECTION, SOLUTION INTRAVENOUS at 18:15

## 2019-03-15 RX ADMIN — NITROGLYCERIN SCH EA: 20 OINTMENT TOPICAL at 02:57

## 2019-03-15 RX ADMIN — OXYCODONE HYDROCHLORIDE AND ACETAMINOPHEN PRN TAB: 5; 325 TABLET ORAL at 18:33

## 2019-03-15 RX ADMIN — NITROGLYCERIN SCH EA: 20 OINTMENT TOPICAL at 13:52

## 2019-03-15 RX ADMIN — TAZOBACTAM SODIUM AND PIPERACILLIN SODIUM SCH MLS/HR: 375; 3 INJECTION, SOLUTION INTRAVENOUS at 12:28

## 2019-03-15 RX ADMIN — NITROGLYCERIN SCH: 20 OINTMENT TOPICAL at 09:11

## 2019-03-15 RX ADMIN — SODIUM CHLORIDE ONE: 900 INJECTION, SOLUTION INTRAVENOUS at 10:10

## 2019-03-15 NOTE — EEG
DATE:  03/13/2019



REQUESTING PHYSICIAN: Dr. Vilchis.



REASON FOR THE REQUEST:  Syncope.



MEDICATIONS:  Aspirin and Lopressor.



TECHNIQUE:  Routine electroencephalogram performed with [employer].  Electrodes were applied according to a 10-20 Internal

Placement System; impedances were less than 5 kilohms.



STUDY START TIME:  09:20 a.m.



STUDY END TIME:  09:43 a.m.



TOTAL RECORDING TIME:  23 minutes.



FINDINGS:  During the awake state, the background activity showed an

average of 9 Hz in the posterior head region, normally reactive to eye open

and eye closure.  In the anterior head region, frequency were in the better

range.  The EEG was properly organized.



The drowsiness was characterized by diffuse bilateral attenuation with

frequency dropping into 6 to 7 Hz range.  That was seen at 09:27 a.m.



Sleep was not seen.



Hyperinflation was not performed.  Photic stimulation was performed and

there were no changes in the record.



There was mid amplitude slowing in the right frontal central region that

was mainly seen during drowsiness.



IMPRESSION:  This is an abnormal EEG record that demonstrates the  presence   of
a focal

cortical abnormality involving the right frontal central region.  This is

in keeping with a structural abnormality in the same area.  This should be

correlated with the patient's brain imaging.



Of note, the EEG was markedly artifactual.  However, I

do believe there is a focal cortical abnormality involving the right

frontal central region.













If clinically appropriate, the patient may benefit from an inpatient

video-EEG monitor study or from repeat a routine electroencephalogram.







__________________________________________

Babar Hernández MD





DD:  03/14/2019 10:28:55

DT:  03/14/2019 18:10:45

Job # [id number]

DEBRA

## 2019-03-15 NOTE — PN
DATE:  03/15/2019



SUBJECTIVE:  The patient is febrile and chilly today.  He is experiencing

productive cough.  He denies any substernal chest pain, palpitation or

dizziness.  Monitor revealed sinus rhythm with ventricular bigeminy.



PHYSICAL EXAMINATION:

VITAL SIGNS:  Blood pressure 110/67, heart rate 102.1, respirations _____.

HEENT:  Normocephalic.

CHEST:  Diminished breath sounds over the bases.

HEART:  S1 and S2 regular.

EXTREMITIES:  Trace leg edema.



LABORATORY DATA:  SMA-7 is within normal limits except for sodium of 131.



A portable chest x-ray done today revealed borderline cardiomegaly with

prominent bronchovascular markings.



Yesterday's EKG revealed sinus rhythm with frequent PVCs, left anterior

fascicular block; and today's EKG revealed sinus rhythm with ventricular

bigeminy.



ASSESSMENT:

1.  Syncopal episode.

2.  Runs of ventricular bigeminy.

3.  Fever, rule out underlying sepsis.

4.  Hyponatremia.

5.  Hypertension.

6.  Diastolic left ventricular dysfunction.

7.  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



RECOMMENDATIONS:  I did review the old records. The most recent cardiac

cath was in 07/2017 with unremarkable _____ secretions, possibility of

microvascular disease was considered.  The patient had normal ejection

fraction at this time.  Continue current IV Zithromax, aspirin 81 mg once a

day.  Lopressor was increased to 50 mg twice a day, Zosyn was started at

3.375 g intravenously every 6 hours.  Blood cultures were taken.



















The patient will go for chest x-ray AP and lateral.







__________________________________________

Mick Jain MD





DD:  03/15/2019 13:26:09

DT:  03/15/2019 13:29:43

Job # [id number]

## 2019-03-15 NOTE — RAD
Date of service: 



03/15/2019



HISTORY:

 febrile 



COMPARISON:

Comparison is made with 08/19/2017 



FINDINGS:



LUNGS:

No evidence of new infiltrate or consolidation in the lungs.



PLEURA:

No significant pleural effusion identified, no pneumothorax apparent.



CARDIOVASCULAR:

No aortic atherosclerotic calcification present.



Normal cardiac size. No pulmonary vascular congestion. 



OSSEOUS STRUCTURES:

No significant abnormalities.



VISUALIZED UPPER ABDOMEN:

Normal.



OTHER FINDINGS:

None.



IMPRESSION:

No definite evidence of pneumonia.

## 2019-03-15 NOTE — PN
DATE:  03/14/2019



SUBJECTIVE:  The patient today is alert, awake and oriented.  The patient

was seen and examined.  The patient denied any shortness of breath, no

cough, but still complaining of some pain to the left side of the chest

wall, which is partially relieved with Lidoderm patch.  The patient denied

any chest pain.  No palpitations.



PHYSICAL EXAMINATION:

VITAL SIGNS:  Blood pressure is 114/68, pulse is 78, respiration is 18,

temperature 98.7, and O2 saturation on room air was 93%.

NECK:  Supple.

LUNGS:  Has some fine rales at the base.

HEART:  Regular rate and rhythm.  Positive murmur.

ABDOMEN:  Soft, nontender.

CHEST:  There is some tenderness with pressure over the left side of the

chest like pressure.

EXTREMITIES:  There is no edema.  Mild tenderness in the right hip on

motion.



LABORATORY DATA:  The patient this morning was found to be tachycardic at

the rate of 105 to 115.  We have ordered CT angio of the chest that showed

bibasilar infiltrates, but no pulmonary embolism.  There is an incompletely

imaged probable simple bilateral renal cyst with benign right axillary

lymph node.  Also, the patient has some blood tests.  The blood tests show

that the WBC is 10.5, hemoglobin 13.1, hematocrit 40.3, and platelets are

186.  Chemistry showed that the sodium 135, potassium 3.8, chloride 97,

bicarb is 30, BUN 22, creatinine 1.4.  AST 29, ALT 25, alkaline phosphatase

92.



ASSESSMENT AND PLAN:  The plan is as we did a CT of the chest and also the

patient is still with the event monitor or loop recorder.  The case was

discussed with Dr. Jain.  No need for cardiac catheterization as per

Dr. Jain.  So, we are going to re-hydrate the patient tonight and

blood work tomorrow.  We are going to evaluate the patient in the morning. 

The case was discussed with Celia Navarrete, the nurse practitioner.







__________________________________________

Ebenezer Mcpherson MD





DD:  03/14/2019 21:11:23

DT:  03/14/2019 22:39:06

Job # [id number]

## 2019-03-15 NOTE — RAD
Date of service: 



03/15/2019



HISTORY:

Evaluate for pneumonia 



COMPARISON:

03/15/2019 



TECHNIQUE:

Chest PA and lateral



FINDINGS:



LINES AND TUBES:

None. 



LUNG AND PLEURA:

The lungs are well inflated.  There is subsegmental atelectasis in 

the left lower lobe.  No pleural effusion or pneumothorax.



HEART AND MEDIASTINUM:

The heart is not enlarged.  No aortic atherosclerotic calcifications 

present.  The hilar and mediastinal contours are within normal limits.



SKELETAL STRUCTURES:

The bony structures are within normal limits for the patient's age.  

There is mild dextroscoliosis in the thoracic spine. 



VISUALIZED UPPER ABDOMEN:

Normal.



OTHER FINDINGS:

None.



IMPRESSION:

Subsegmental atelectasis in the left lower lobe.  Superimposed 

pneumonia cannot be excluded.  Follow-up is recommended.

## 2019-03-15 NOTE — CARD
--------------- APPROVED REPORT --------------





Date of service: 03/14/2019



EKG Measurement

Heart [id number]

DC [id number]

[id number]

[id number]

[id number]



<Conclusion>

Sinus rhythm with frequent premature ventricular complexes

Left anterior fascicular block

Abnormal ECG

## 2019-03-16 LAB
BASOPHILS # BLD AUTO: 0.1 K/UL (ref 0–0.2)
BASOPHILS NFR BLD: 0.7 % (ref 0–2)
BUN SERPL-MCNC: 16 MG/DL (ref 9–20)
CALCIUM SERPL-MCNC: 9.5 MG/DL (ref 8.6–10.4)
EOSINOPHIL # BLD AUTO: 0.1 K/UL (ref 0–0.7)
EOSINOPHIL NFR BLD: 1.8 % (ref 0–4)
ERYTHROCYTE [DISTWIDTH] IN BLOOD BY AUTOMATED COUNT: 14.4 % (ref 11.5–14.5)
GFR NON-AFRICAN AMERICAN: 45
HGB BLD-MCNC: 11.7 G/DL (ref 12–18)
LYMPHOCYTES # BLD AUTO: 1.2 K/UL (ref 1–4.3)
LYMPHOCYTES NFR BLD AUTO: 15.3 % (ref 20–40)
MCH RBC QN AUTO: 33 PG (ref 27–31)
MCHC RBC AUTO-ENTMCNC: 33.2 G/DL (ref 33–37)
MCV RBC AUTO: 99.2 FL (ref 80–94)
MONOCYTES # BLD: 0.9 K/UL (ref 0–0.8)
MONOCYTES NFR BLD: 11.7 % (ref 0–10)
NEUTROPHILS # BLD: 5.7 K/UL (ref 1.8–7)
NEUTROPHILS NFR BLD AUTO: 70.5 % (ref 50–75)
NRBC BLD AUTO-RTO: 0 % (ref 0–2)
PLATELET # BLD: 173 K/UL (ref 130–400)
PMV BLD AUTO: 7.2 FL (ref 7.2–11.7)
RBC # BLD AUTO: 3.56 MIL/UL (ref 4.4–5.9)
WBC # BLD AUTO: 8.1 K/UL (ref 4.8–10.8)

## 2019-03-16 RX ADMIN — TAZOBACTAM SODIUM AND PIPERACILLIN SODIUM SCH MLS/HR: 375; 3 INJECTION, SOLUTION INTRAVENOUS at 11:59

## 2019-03-16 RX ADMIN — TAZOBACTAM SODIUM AND PIPERACILLIN SODIUM SCH MLS/HR: 375; 3 INJECTION, SOLUTION INTRAVENOUS at 17:40

## 2019-03-16 RX ADMIN — TAZOBACTAM SODIUM AND PIPERACILLIN SODIUM SCH MLS/HR: 375; 3 INJECTION, SOLUTION INTRAVENOUS at 05:30

## 2019-03-16 RX ADMIN — TAZOBACTAM SODIUM AND PIPERACILLIN SODIUM SCH MLS/HR: 375; 3 INJECTION, SOLUTION INTRAVENOUS at 22:40

## 2019-03-16 RX ADMIN — Medication SCH MG: at 09:43

## 2019-03-16 NOTE — PN
DATE:  03/15/2019



SUBJECTIVE:  Today, the patient is alert and awake and sitting in edge of

the bed and complaining of some congestive cough.  No shortness of breath

at rest.  Complaining of some pain to the posterior left side of the chest

at the site of rib fracture.  The patient was found this morning having a

temperature of 102.1.  The patient denied any chest pain or palpitation. 

No dysuria.



PHYSICAL EXAMINATION:

VITAL SIGNS:  The patient has a blood pressure of 109/65, pulse 72,

respiration 20, temperature 98.1.

NECK:  Supple.

LUNGS:  Has some rales bilaterally.

HEART:  Regular rate and rhythm and positive murmur.  Positive infrequent

extra beats.

ABDOMEN:  Soft, nontender.  No palpable mass but obese.

EXTREMITIES:  There is tenderness to the right hip.



LABORATORY DATA:  The patient has chemistries done today, show that sodium

131, potassium 3.9, chloride 99, BUN is 16, creatinine 1.2 and GFR is 69. 

ProBNP is 2100.



ASSESSMENT AND PLAN:  Plan is that the patient is put on empiric antibiotic

like Zosyn.  ID consult ordered.  Also, we are going to decrease the

intravenous fluid.  The intravenous fluid will be decreased to 50 mL per

hour, and also antibiotic will be added beside the Zosyn, azithromycin is

also ordered.  The case was reviewed and discussed with Celia Navarrete,

nurse practitioner.







__________________________________________

Ebenezer Mcpherson MD





DD:  03/15/2019 21:38:04

DT:  03/16/2019 0:47:01

Job # [id number]

## 2019-03-16 NOTE — PN
DATE:  03/16/2019



SUBJECTIVE:  Today, the patient is alert, awake and oriented x3.  The

patient was seen and examined, lying on bed.  Denied any shortness of

breath or chest pain.  The pain on the left side seems to be subsided and

he denies any chest pain or shortness of breath.



PHYSICAL EXAMINATION:

VITAL SIGNS:  Blood pressure of 128/81, pulse 70, respirations 20,

temperature 98.

NECK:  Supple.

LUNGS:  Some rales bilaterally, improving.

HEART:  Regular in rhythm.  Positive murmur.

ABDOMEN:  Soft and nontender.  No palpable mass.

CHEST:  There is a mild tenderness on the left side at the site of the

fracture.

EXTREMITIES:  No edema and no tenderness.



LABORATORY DATA:  Shows that WBC is 8.1, hemoglobin 11.7, hematocrit 35.4,

and platelet 173.  Chemistry showed sodium 126, potassium 4.2, chloride

100, bicarb 39, BUN 16, creatinine 1.5, and calcium 9.5.  The urine has

gram-negative rods.



PLAN:  The plan is he is going to continue on antibiotic therapy and

aggressive _____.







__________________________________________

Ebenezer Mcpherson MD





DD:  03/16/2019 17:40:43

DT:  03/16/2019 19:58:33

Job # [id number]

## 2019-03-16 NOTE — PN
DATE:  03/16/2019



SUBJECTIVE:  The patient denies chest pain, palpitations or dizziness.



PHYSICAL EXAMINATION:

VITAL SIGNS:  Blood pressure 148/74, heart rate 70, temperature 97.8,

respirations 20.

HEENT:  Normocephalic.

CHEST:  Diminished breath sounds over the bases.

HEART:  S1 and S2 regular.

EXTREMITIES:  Trace leg edema.



LABORATORY DATA:  Today's hemoglobin and hematocrit are 11.7 and 35.4. 

White count and platelet count are within normal limits.



Today's SMA-7 is entirely within normal limits.



Urine culture is positive for Gram-negative rods, more than 100,000

colonies.  The final identification is still pending.



Blood culture negative after 24 hours.



Repeat chest x-ray yesterday, PA and lateral, revealed subsegmental

atelectasis in the left lower lobe.  Superimposed pneumonia cannot be

excluded.  Followup is recommended.



ASSESSMENT:

1.  Syncopal episode.

2.  Improved ventricular ectopy.

3.  History of multiple cerebrovascular accidents.

4.  Gram-negative urinary tract infection; rule out urosepsis.

5.  Hypertension.



RECOMMENDATIONS:  Continue Cozaar 25 mg once a day, aspirin 81 mg once

daily, Lasix 20 mg intravenous twice a day, Lopressor 50 mg twice a day,

Percocet 1 tablet every 12 hours p.r.n., IV Zosyn 3.375 g every 6 hours.







__________________________________________

Mick Jain MD





DD:  03/16/2019 16:02:26

DT:  03/16/2019 17:09:22

Job # [id number]

## 2019-03-17 RX ADMIN — TAZOBACTAM SODIUM AND PIPERACILLIN SODIUM SCH MLS/HR: 375; 3 INJECTION, SOLUTION INTRAVENOUS at 17:41

## 2019-03-17 RX ADMIN — TAZOBACTAM SODIUM AND PIPERACILLIN SODIUM SCH MLS/HR: 375; 3 INJECTION, SOLUTION INTRAVENOUS at 05:02

## 2019-03-17 RX ADMIN — Medication SCH MG: at 10:06

## 2019-03-17 RX ADMIN — TAZOBACTAM SODIUM AND PIPERACILLIN SODIUM SCH MLS/HR: 375; 3 INJECTION, SOLUTION INTRAVENOUS at 10:08

## 2019-03-17 RX ADMIN — TAZOBACTAM SODIUM AND PIPERACILLIN SODIUM SCH MLS/HR: 375; 3 INJECTION, SOLUTION INTRAVENOUS at 23:30

## 2019-03-17 NOTE — PN
DATE:  03/17/2019



SUBJECTIVE:  The patient denies any fever or chills.  His wheezing is

improved except for mild cough.  No retrosternal chest pain.



PHYSICAL EXAMINATION:

VITAL SIGNS:  Blood pressure 127/79, heart rate 60, temperature 97.9, and

respirations 18.

HEENT:  Normocephalic.

CHEST:  Right basilar rhonchi and wheezing.

HEART:  S1 and S2, regular.

ABDOMEN:  Soft.

EXTREMITIES:  No edema.



ASSESSMENT:

1.  Status post syncopal episode.

2.  Improved ventricular ectopy.

3.  Pneumonia.

4.  Gram-negative urinary tract infection.

5.  History of multiple cerebrovascular accidents in the past.



RECOMMENDATIONS:  Continue Cozaar 25 mg once a day, aspirin 81 mg once a

day, Lasix 20 mg intravenously twice a day, Lopressor 50 mg twice a day,

Zosyn 3.375 g intravenously every 6 hours.





__________________________________________

Mick Jain MD





DD:  03/17/2019 17:20:49

DT:  03/17/2019 18:47:51

Job # [id number]

## 2019-03-18 VITALS — SYSTOLIC BLOOD PRESSURE: 130 MMHG | DIASTOLIC BLOOD PRESSURE: 72 MMHG | TEMPERATURE: 97.7 F | OXYGEN SATURATION: 96 %

## 2019-03-18 VITALS — HEART RATE: 75 BPM

## 2019-03-18 VITALS — RESPIRATION RATE: 20 BRPM

## 2019-03-18 LAB
ALBUMIN SERPL-MCNC: 3.5 G/DL (ref 3.5–5)
ALBUMIN/GLOB SERPL: 1 {RATIO} (ref 1–2.1)
ALT SERPL-CCNC: 33 U/L (ref 21–72)
AST SERPL-CCNC: 49 U/L (ref 17–59)
BASOPHILS # BLD AUTO: 0.1 K/UL (ref 0–0.2)
BASOPHILS NFR BLD: 0.7 % (ref 0–2)
BUN SERPL-MCNC: 14 MG/DL (ref 9–20)
CALCIUM SERPL-MCNC: 9.7 MG/DL (ref 8.6–10.4)
EOSINOPHIL # BLD AUTO: 0.3 K/UL (ref 0–0.7)
EOSINOPHIL NFR BLD: 4 % (ref 0–4)
ERYTHROCYTE [DISTWIDTH] IN BLOOD BY AUTOMATED COUNT: 14 % (ref 11.5–14.5)
GFR NON-AFRICAN AMERICAN: 39
HGB BLD-MCNC: 13 G/DL (ref 12–18)
LYMPHOCYTES # BLD AUTO: 2 K/UL (ref 1–4.3)
LYMPHOCYTES NFR BLD AUTO: 26.2 % (ref 20–40)
MCH RBC QN AUTO: 33.5 PG (ref 27–31)
MCHC RBC AUTO-ENTMCNC: 34.2 G/DL (ref 33–37)
MCV RBC AUTO: 97.8 FL (ref 80–94)
MONOCYTES # BLD: 0.5 K/UL (ref 0–0.8)
MONOCYTES NFR BLD: 7.1 % (ref 0–10)
NEUTROPHILS # BLD: 4.6 K/UL (ref 1.8–7)
NEUTROPHILS NFR BLD AUTO: 62 % (ref 50–75)
NRBC BLD AUTO-RTO: 0.1 % (ref 0–2)
PLATELET # BLD: 227 K/UL (ref 130–400)
PMV BLD AUTO: 7.3 FL (ref 7.2–11.7)
RBC # BLD AUTO: 3.89 MIL/UL (ref 4.4–5.9)
WBC # BLD AUTO: 7.4 K/UL (ref 4.8–10.8)

## 2019-03-18 RX ADMIN — TAZOBACTAM SODIUM AND PIPERACILLIN SODIUM SCH MLS/HR: 375; 3 INJECTION, SOLUTION INTRAVENOUS at 05:21

## 2019-03-18 RX ADMIN — Medication SCH MG: at 09:55

## 2019-03-18 RX ADMIN — OXYCODONE HYDROCHLORIDE AND ACETAMINOPHEN PRN TAB: 5; 325 TABLET ORAL at 09:54

## 2019-03-18 NOTE — RAD
Date of service: 



03/18/2019



HISTORY:

 pneumonia 



COMPARISON:

3/15/2019



TECHNIQUE:

Chest PA and lateral



FINDINGS:



LUNGS:

Examination is somewhat limited due to oblique positioning.  No 

infiltrate.



PLEURA:

No significant pleural effusion identified. No pneumothorax apparent.



CARDIOVASCULAR:

There is atherosclerotic calcification of the thoracic aorta.



Normal cardiac size. No pulmonary vascular congestion. 



OSSEOUS STRUCTURES:

No significant abnormalities.



VISUALIZED UPPER ABDOMEN:

Normal.



OTHER FINDINGS:

None.



IMPRESSION:

No active disease.

## 2019-03-18 NOTE — PN
DATE:  03/17/2019



SUBJECTIVE:  Today the patient is seen and examined.  The patient is alert

and awake.  No shortness of breath.  Denied any pain.  No dizziness.  No

palpitations.



PHYSICAL EXAMINATION:

VITAL SIGNS:  Blood pressure 134/68, pulse 94, respirations 18 to 20, and

temperature 97.4.

NECK:  Supple.

LUNGS:  Has some fine rales at the bases.

HEART:  Regular rate and rhythm with a positive murmur.  _____ extra

systole.

ABDOMEN:  Soft and nontender.  No palpable mass.

EXTREMITIES:  No edema.



LABORATORY DATA:  The patient had blood work done from yesterday.



PLAN:  The plan is that we are going to _____ CBC, CMP and _____ going to

repeat a chest x-ray and we are going to continue antibiotic therapy.







__________________________________________

Ebenezer Mcpherson MD





DD:  03/17/2019 14:39:34

DT:  03/17/2019 16:22:27

Job # [id number]

## 2019-03-19 NOTE — PN
DATE:  03/18/2019



SUBJECTIVE:  Today, the patient is alert and awake.  The patient was seen

and evaluated.  The patient is sitting on the chair.  Denies any chest

pain.  No palpitation.  No dizziness.  No constipation.



PHYSICAL EXAMINATION:

VITAL SIGNS:  The patient has a blood pressure of 120/72, pulse 75,

respirations is 20, and temperature 97.7.

NECK:  Supple.  No JVD.

LUNGS:  There are some fine rales at the bases.

HEART:  Regular rate and rhythm.  Positive murmur.

ABDOMEN:  Soft and nontender.  Positive bowel sounds.

EXTREMITIES:  There is no edema.



LABORATORY DATA:  The patient's blood work has shown WBC 7.4, hemoglobin

13, hematocrit 38, and platelet is 227.  Chemistry shows that the sodium is

136, potassium 3.6, chloride 95, BUN 14, creatinine 1.7, and glucose was

90.  The patient also has a urine culture that was positive for E. coli

sensitive to Zosyn.  The patient was already on Zosyn.  The patient is

afebrile.



PLAN:  The plan is that we are going to consider to discharge the patient

home today.







__________________________________________

Ebenezer Mcpherson MD





DD:  03/18/2019 20:10:17

DT:  03/18/2019 21:14:52

Job # [id number]

## 2019-03-19 NOTE — DS
HISTORY OF PRESENT ILLNESS:  This patient is a 79-year-old male with

history of hypertension, arthritis, and the patient has history of CVA in

the past.  The patient came to the emergency room because he had a syncopal

episode and _____ the patient has hit the left side of the chest and

sustained a nondisplaced fracture of the ribs, and the patient was admitted

and the patient stated that he was going to bathroom, but he just passed

out and his son came to help him to get up and the patient went to the

emergency room.



PHYSICAL EXAMINATION:

GENERAL:  We have found the patient was alert and awake at the time of

examination.

LUNGS:  With some rales bilaterally.

HEART:  Regular rate and rhythm, but there was frequent extra systole.



The patient had different tests done and had a CT of the head that showed

old infract and carotid Doppler was insignificant.  There was no stenosis

and the MRI also was done, but the patient during this had Cardiology

consult with Dr. Jain and also had physiologist consult with Dr. Cabrera.

A tilt table test was done, was negative; however, the patient was

developing a lot of PVC including frequent PVC that was considered as

V-tach.  Suggestion was made by Dr. Cabrera, the cardiologist to put event

monitor or a loop recorder, but the patient has refused categorically and

stated that he is not ready for that at this point.  So, the patient during

the course of hospitalization, the patient developed a fever of 102.1 and

was found to have in the urine E. coli and was sensitive to Zosyn and which

the patient was receiving at the time of fever.  Now, the patient is doing

well, afebrile and the heart is regular, but there is no extra systole and

the abdomen is soft.  So, the rest of the test is echocardiogram that was

unremarkable.  So, the patient will go home on Augmentin and magnesium and

will continue to receive current medication.  The patient will come to see

me within one week, but however, the patient was advised to follow with Dr. Cabrera, the physiologist.







__________________________________________

Ebenezer Mcpherson MD





DD:  03/18/2019 20:15:35

DT:  03/19/2019 2:07:30

Job # [id number]